# Patient Record
Sex: FEMALE | Race: OTHER | NOT HISPANIC OR LATINO
[De-identification: names, ages, dates, MRNs, and addresses within clinical notes are randomized per-mention and may not be internally consistent; named-entity substitution may affect disease eponyms.]

---

## 2021-02-18 ENCOUNTER — TRANSCRIPTION ENCOUNTER (OUTPATIENT)
Age: 26
End: 2021-02-18

## 2024-04-03 PROBLEM — Z00.00 ENCOUNTER FOR PREVENTIVE HEALTH EXAMINATION: Status: ACTIVE | Noted: 2024-04-03

## 2024-04-03 PROBLEM — R00.0 SINUS TACHYCARDIA: Status: ACTIVE | Noted: 2024-04-03

## 2024-04-04 ENCOUNTER — APPOINTMENT (OUTPATIENT)
Dept: NEUROSURGERY | Facility: CLINIC | Age: 29
End: 2024-04-04
Payer: COMMERCIAL

## 2024-04-04 VITALS
TEMPERATURE: 97.4 F | HEART RATE: 85 BPM | HEIGHT: 64 IN | DIASTOLIC BLOOD PRESSURE: 66 MMHG | BODY MASS INDEX: 18.78 KG/M2 | RESPIRATION RATE: 18 BRPM | WEIGHT: 110 LBS | SYSTOLIC BLOOD PRESSURE: 99 MMHG | OXYGEN SATURATION: 98 %

## 2024-04-04 DIAGNOSIS — S06.0XAA CONCUSSION WITH LOSS OF CONSCIOUSNESS STATUS UNKNOWN, INITIAL ENCOUNTER: ICD-10-CM

## 2024-04-04 DIAGNOSIS — Z86.19 PERSONAL HISTORY OF OTHER INFECTIOUS AND PARASITIC DISEASES: ICD-10-CM

## 2024-04-04 DIAGNOSIS — Z86.79 PERSONAL HISTORY OF OTHER DISEASES OF THE CIRCULATORY SYSTEM: ICD-10-CM

## 2024-04-04 DIAGNOSIS — G43.909 MIGRAINE, UNSPECIFIED, NOT INTRACTABLE, W/OUT STATUS MIGRAINOSUS: ICD-10-CM

## 2024-04-04 DIAGNOSIS — R00.0 TACHYCARDIA, UNSPECIFIED: ICD-10-CM

## 2024-04-04 PROCEDURE — 99205 OFFICE O/P NEW HI 60 MIN: CPT
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
## 2024-04-05 NOTE — PHYSICAL EXAM
[General Appearance - Alert] : alert [General Appearance - In No Acute Distress] : in no acute distress [Oriented To Time, Place, And Person] : oriented to person, place, and time [Impaired Insight] : insight and judgment were intact [Affect] : the affect was normal [Motor Tone] : muscle tone was normal in all four extremities [Motor Strength] : muscle strength was normal in all four extremities [Balance] : balance was intact [Sclera] : the sclera and conjunctiva were normal [Extraocular Movements] : extraocular movements were intact [Full Visual Field] : full visual field [Outer Ear] : the ears and nose were normal in appearance [Neck Appearance] : the appearance of the neck was normal [] : no respiratory distress [Abnormal Walk] : normal gait [Skin Color & Pigmentation] : normal skin color and pigmentation [Skin Lesions] : no skin lesions [FreeTextEntry1] : sinus tachycardia
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
## 2024-04-05 NOTE — REASON FOR VISIT
[New Patient Visit] : a new patient visit [Referred By: _________] : Patient was referred by ASHLEY [Parent] : parent [FreeTextEntry1] : Right Supraclinoid ICA and AComm aneurysm

## 2024-04-05 NOTE — ASSESSMENT
[FreeTextEntry1] : NELI GANDARA is a 28 year-old female who presents for neuroendovascular evaluation of incidental finding of 6mm saccular Supraclinoid ICA.    I have discussed the natural history and treatment options for cerebral aneurysms with the patient. I explained that a variety of factors must be taken into consideration when managing cerebral aneurysm, namely the lesion size, shape, location, and personal/family history, and patient's age. I informed the patient that the main risk with cerebral aneurysms is rupture, leading to Subarachnoid Hemorrhage. I detailed the consequences of SAH, including its morbidity and mortality. I then presented the options of 1) conservative management with serial imaging, 2) aneurysm repair via endovascular approaches vs microsurgery. I thoroughly discussed the indications, risks, possible complications, and expected outcomes of each treatment option with the patient. In the end, I recommend diagnostic cerebral aneurysm to better evaluate the aneurysm and cerebral vasculature.  I reviewed and answered all patient questions.  PLAN: - Diagnostic cerebral angiogram 4/19/24 - clearance given to patient - return to Dr. Solomon clinic to review - see Neurology for Concussion evaluation - recommend Connective Tissue workup in the future for aneurysm etiology   The patient was instructed that if they should immediately call 911 or go to the Emergency Department if they experience symptoms of severe thunderclap headache, syncope, unexplained nausea/vomiting, visual changes, seizure-like activity, new weakness or numbness of extremities.   Patient verbalizes agreement and understanding with plan of care.   I, Dr. Solomon, personally performed the evaluation and management (E/M) services for this new patient. That E/M includes conducting the initial examination, assessing all conditions, and establishing the plan of care. Today, NAHEED Vale was here to observe my evaluation and management services for this patient to be followed going forward.

## 2024-04-18 ENCOUNTER — APPOINTMENT (OUTPATIENT)
Dept: NEUROSURGERY | Facility: CLINIC | Age: 29
End: 2024-04-18
Payer: COMMERCIAL

## 2024-04-18 ENCOUNTER — TRANSCRIPTION ENCOUNTER (OUTPATIENT)
Age: 29
End: 2024-04-18

## 2024-04-18 VITALS
HEIGHT: 64 IN | DIASTOLIC BLOOD PRESSURE: 69 MMHG | BODY MASS INDEX: 18.78 KG/M2 | OXYGEN SATURATION: 99 % | TEMPERATURE: 97.6 F | HEART RATE: 77 BPM | RESPIRATION RATE: 18 BRPM | WEIGHT: 110 LBS | SYSTOLIC BLOOD PRESSURE: 102 MMHG

## 2024-04-18 PROCEDURE — 99215 OFFICE O/P EST HI 40 MIN: CPT

## 2024-04-25 ENCOUNTER — NON-APPOINTMENT (OUTPATIENT)
Age: 29
End: 2024-04-25

## 2024-04-25 NOTE — ASSESSMENT
[FreeTextEntry1] : NELI GANDARA is a 28 year-old female who presents for follow-up of saccular Right Supraclinoid ICA aneurysm and to review recent diagnostic cerebral angiogram done at [facility] 4/12/24, that shows 7x5mm Right Supraclinoid ICA aneurysm as well as another 4mm Right Hypophyseal aneurysm.   I have discussed the natural history and treatment options for cerebral aneurysms with the patient. I explained that a variety of factors must be taken into consideration when managing cerebral aneurysm, namely the lesion size, shape, location, and personal/family history, and patient's age. I informed the patient that the main risk with cerebral aneurysms is rupture, leading to Subarachnoid Hemorrhage. I detailed the consequences of SAH, including its morbidity and mortality. I then presented the options of 1) conservative management with serial imaging, 2) aneurysm repair via endovascular approaches vs microsurgery. I thoroughly discussed the indications, risks, possible complications, and expected outcomes of each treatment option with the patient. In the end, I recommend stent placement, 1 to 2 depending on what I encounter day of procedure.   I reviewed and answered all patient questions.  PLAN: - will discuss case in Neurovascular Conference; Dr. Solomon will call with results - f/u with Neurology for Concussion evaluation - recommend Connective Tissue workup in the future for aneurysm etiology  The patient was instructed that if they should immediately call 911 or go to the Emergency Department if they experience symptoms of severe thunderclap headache, syncope, unexplained nausea/vomiting, visual changes, seizure-like activity, new weakness or numbness of extremities.   Patient verbalizes agreement and understanding with plan of care.  I, Dr. Solomon, personally performed the evaluation and management (E/M) services for this established patient who presents today with (a) new problem(s)/exacerbation of (an) existing condition(s). That E/M includes conducting the examination, assessing all new/exacerbated conditions, and establishing a new plan of care. Today, NAHEED Vale, was here to observe my evaluation and management services for this new problem/exacerbated condition to be followed going forward.

## 2024-04-25 NOTE — PHYSICAL EXAM
[General Appearance - Alert] : alert [Oriented To Time, Place, And Person] : oriented to person, place, and time [Impaired Insight] : insight and judgment were intact [Affect] : the affect was normal [Mood] : the mood was normal [Motor Tone] : muscle tone was normal in all four extremities [Motor Strength] : muscle strength was normal in all four extremities [Balance] : balance was intact [Sclera] : the sclera and conjunctiva were normal [Extraocular Movements] : extraocular movements were intact [Full Visual Field] : full visual field [Outer Ear] : the ears and nose were normal in appearance [Neck Appearance] : the appearance of the neck was normal [] : no respiratory distress [Heart Rate And Rhythm] : heart rate was normal and rhythm regular [No CVA Tenderness] : no ~M costovertebral angle tenderness [Abnormal Walk] : normal gait [Skin Color & Pigmentation] : normal skin color and pigmentation [FreeTextEntry6] : Twice a week headaches that are 4 out of 10 pain, improves with Excedrin or Tylenol
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
## 2024-04-25 NOTE — REASON FOR VISIT
[Follow-Up: _____] : a [unfilled] follow-up visit [Parent] : parent [FreeTextEntry1] : Right ICA aneurysms x2
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
## 2024-05-03 ENCOUNTER — RX RENEWAL (OUTPATIENT)
Age: 29
End: 2024-05-03

## 2024-05-03 ENCOUNTER — APPOINTMENT (OUTPATIENT)
Dept: NEUROSURGERY | Facility: CLINIC | Age: 29
End: 2024-05-03

## 2024-05-03 DIAGNOSIS — S09.90XS POST-TRAUMATIC HEADACHE, UNSPECIFIED, NOT INTRACTABLE: ICD-10-CM

## 2024-05-03 DIAGNOSIS — G44.309 POST-TRAUMATIC HEADACHE, UNSPECIFIED, NOT INTRACTABLE: ICD-10-CM

## 2024-05-03 RX ORDER — ASPIRIN 81 MG/1
81 TABLET, COATED ORAL
Qty: 90 | Refills: 0 | Status: ACTIVE | COMMUNITY
Start: 2024-05-03 | End: 1900-01-01

## 2024-05-03 RX ORDER — CLOPIDOGREL BISULFATE 75 MG/1
75 TABLET, FILM COATED ORAL
Qty: 90 | Refills: 1 | Status: ACTIVE | COMMUNITY
Start: 2024-05-03 | End: 1900-01-01

## 2024-05-03 NOTE — REASON FOR VISIT
[Home] : at home, [unfilled] , at the time of the visit. [Medical Office: ([facility])___] : at the medical office located in  [Patient] : the patient [Self] : self [Follow-Up: _____] : a [unfilled] follow-up visit [FreeTextEntry1] : Preop appt

## 2024-05-03 NOTE — PHYSICAL EXAM
[General Appearance - Alert] : alert [General Appearance - In No Acute Distress] : in no acute distress [Oriented To Time, Place, And Person] : oriented to person, place, and time [Impaired Insight] : insight and judgment were intact [Affect] : the affect was normal [Mood] : the mood was normal [Extraocular Movements] : extraocular movements were intact [Sclera] : the sclera and conjunctiva were normal [Outer Ear] : the ears and nose were normal in appearance [Neck Appearance] : the appearance of the neck was normal [] : no respiratory distress [Skin Color & Pigmentation] : normal skin color and pigmentation

## 2024-05-03 NOTE — ASSESSMENT
[FreeTextEntry1] : NELI GANDARA is a 29 year-old female who presents today to my clinic to discuss preparation instructions for    PLAN: - stent placement procedure scheduled for 5/17/24 - medical clearance given to patient - start DAPT today 5/03 - check Accumetrics 5/13 - return to Dr. Solomon clinic to review    The patient was instructed that if they should immediately call 911 or go to the Emergency Department if they experience symptoms of severe thunderclap headache, syncope, unexplained nausea/vomiting, visual changes, seizure-like activity, new weakness or numbness of extremities.   Patient verbalizes agreement and understanding with plan of care.

## 2024-05-03 NOTE — REVIEW OF SYSTEMS
[Feeling Tired] : feeling tired [Memory Lapses or Loss] : memory loss [Decr. Concentrating Ability] : decreased concentrating ability [Sleep Disturbances] : sleep disturbances [Anxiety] : anxiety [As Noted in HPI] : as noted in HPI [Negative] : Respiratory [de-identified] : Only able to sleep 4-5 hrs at a time, previously 8hrs straight [FreeTextEntry3] : occasional floaters in Right eye
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
## 2024-05-13 ENCOUNTER — APPOINTMENT (OUTPATIENT)
Dept: OPHTHALMOLOGY | Facility: CLINIC | Age: 29
End: 2024-05-13
Payer: COMMERCIAL

## 2024-05-13 ENCOUNTER — NON-APPOINTMENT (OUTPATIENT)
Age: 29
End: 2024-05-13

## 2024-05-13 PROCEDURE — 92083 EXTENDED VISUAL FIELD XM: CPT

## 2024-05-13 PROCEDURE — 92004 COMPRE OPH EXAM NEW PT 1/>: CPT

## 2024-05-14 ENCOUNTER — NON-APPOINTMENT (OUTPATIENT)
Age: 29
End: 2024-05-14

## 2024-05-14 LAB
PA ADP PRP-ACNC: 74 PRU
PLATELET RESPONSE ASPIRIN: 376 ARU

## 2024-05-17 ENCOUNTER — APPOINTMENT (OUTPATIENT)
Dept: NEUROSURGERY | Facility: HOSPITAL | Age: 29
End: 2024-05-17

## 2024-05-17 ENCOUNTER — INPATIENT (INPATIENT)
Facility: HOSPITAL | Age: 29
LOS: 2 days | Discharge: HOME | DRG: 27 | End: 2024-05-20
Attending: RADIOLOGY | Admitting: RADIOLOGY
Payer: COMMERCIAL

## 2024-05-17 VITALS
DIASTOLIC BLOOD PRESSURE: 58 MMHG | SYSTOLIC BLOOD PRESSURE: 100 MMHG | RESPIRATION RATE: 16 BRPM | HEART RATE: 100 BPM | TEMPERATURE: 99 F | OXYGEN SATURATION: 100 %

## 2024-05-17 DIAGNOSIS — F41.9 ANXIETY DISORDER, UNSPECIFIED: ICD-10-CM

## 2024-05-17 DIAGNOSIS — I47.10 SUPRAVENTRICULAR TACHYCARDIA, UNSPECIFIED: ICD-10-CM

## 2024-05-17 DIAGNOSIS — J45.909 UNSPECIFIED ASTHMA, UNCOMPLICATED: ICD-10-CM

## 2024-05-17 DIAGNOSIS — I67.1 CEREBRAL ANEURYSM, NONRUPTURED: ICD-10-CM

## 2024-05-17 LAB
ALBUMIN SERPL ELPH-MCNC: 4.2 G/DL — SIGNIFICANT CHANGE UP (ref 3.3–5)
ALP SERPL-CCNC: 47 U/L — SIGNIFICANT CHANGE UP (ref 40–120)
ALT FLD-CCNC: 9 U/L — LOW (ref 10–45)
ANION GAP SERPL CALC-SCNC: 9 MMOL/L — SIGNIFICANT CHANGE UP (ref 5–17)
APTT BLD: 83.8 SEC — HIGH (ref 24.5–35.6)
APTT BLD: >200 SEC — CRITICAL HIGH (ref 24.5–35.6)
AST SERPL-CCNC: 14 U/L — SIGNIFICANT CHANGE UP (ref 10–40)
BILIRUB SERPL-MCNC: 0.5 MG/DL — SIGNIFICANT CHANGE UP (ref 0.2–1.2)
BUN SERPL-MCNC: 7 MG/DL — SIGNIFICANT CHANGE UP (ref 7–23)
CALCIUM SERPL-MCNC: 8.6 MG/DL — SIGNIFICANT CHANGE UP (ref 8.4–10.5)
CHLORIDE SERPL-SCNC: 110 MMOL/L — HIGH (ref 96–108)
CO2 SERPL-SCNC: 22 MMOL/L — SIGNIFICANT CHANGE UP (ref 22–31)
CREAT SERPL-MCNC: 0.58 MG/DL — SIGNIFICANT CHANGE UP (ref 0.5–1.3)
EGFR: 127 ML/MIN/1.73M2 — SIGNIFICANT CHANGE UP
GLUCOSE SERPL-MCNC: 119 MG/DL — HIGH (ref 70–99)
HCG UR QL: NEGATIVE — SIGNIFICANT CHANGE UP
HCT VFR BLD CALC: 34.5 % — SIGNIFICANT CHANGE UP (ref 34.5–45)
HGB BLD-MCNC: 11.2 G/DL — LOW (ref 11.5–15.5)
INR BLD: 1.04 — SIGNIFICANT CHANGE UP (ref 0.85–1.18)
ISTAT ACTK (ACTIVATED CLOTTING TIME KAOLIN): 141 SEC — HIGH (ref 74–137)
ISTAT ACTK (ACTIVATED CLOTTING TIME KAOLIN): 228 SEC — HIGH (ref 74–137)
MAGNESIUM SERPL-MCNC: 1.9 MG/DL — SIGNIFICANT CHANGE UP (ref 1.6–2.6)
MCHC RBC-ENTMCNC: 30.2 PG — SIGNIFICANT CHANGE UP (ref 27–34)
MCHC RBC-ENTMCNC: 32.5 GM/DL — SIGNIFICANT CHANGE UP (ref 32–36)
MCV RBC AUTO: 93 FL — SIGNIFICANT CHANGE UP (ref 80–100)
NRBC # BLD: 0 /100 WBCS — SIGNIFICANT CHANGE UP (ref 0–0)
PA ADP PRP-ACNC: 123 PRU — LOW (ref 194–418)
PA ADP PRP-ACNC: 197 PRU — SIGNIFICANT CHANGE UP (ref 194–418)
PHOSPHATE SERPL-MCNC: 3 MG/DL — SIGNIFICANT CHANGE UP (ref 2.5–4.5)
PLATELET # BLD AUTO: 224 K/UL — SIGNIFICANT CHANGE UP (ref 150–400)
POTASSIUM SERPL-MCNC: 3.7 MMOL/L — SIGNIFICANT CHANGE UP (ref 3.5–5.3)
POTASSIUM SERPL-SCNC: 3.7 MMOL/L — SIGNIFICANT CHANGE UP (ref 3.5–5.3)
PROT SERPL-MCNC: 6.5 G/DL — SIGNIFICANT CHANGE UP (ref 6–8.3)
PROTHROM AB SERPL-ACNC: 11.8 SEC — SIGNIFICANT CHANGE UP (ref 9.5–13)
RBC # BLD: 3.71 M/UL — LOW (ref 3.8–5.2)
RBC # FLD: 12.4 % — SIGNIFICANT CHANGE UP (ref 10.3–14.5)
SODIUM SERPL-SCNC: 141 MMOL/L — SIGNIFICANT CHANGE UP (ref 135–145)
WBC # BLD: 5.21 K/UL — SIGNIFICANT CHANGE UP (ref 3.8–10.5)
WBC # FLD AUTO: 5.21 K/UL — SIGNIFICANT CHANGE UP (ref 3.8–10.5)

## 2024-05-17 PROCEDURE — 75894 X-RAYS TRANSCATH THERAPY: CPT | Mod: 26,59

## 2024-05-17 PROCEDURE — 61624 TCAT PERM OCCLS/EMBOLJ CNS: CPT

## 2024-05-17 PROCEDURE — 70450 CT HEAD/BRAIN W/O DYE: CPT | Mod: 26

## 2024-05-17 PROCEDURE — 61650 EVASC PRLNG ADMN RX AGNT 1ST: CPT

## 2024-05-17 PROCEDURE — 99291 CRITICAL CARE FIRST HOUR: CPT

## 2024-05-17 PROCEDURE — 75898 FOLLOW-UP ANGIOGRAPHY: CPT | Mod: 26,59

## 2024-05-17 PROCEDURE — 76377 3D RENDER W/INTRP POSTPROCES: CPT | Mod: 26

## 2024-05-17 PROCEDURE — 76937 US GUIDE VASCULAR ACCESS: CPT | Mod: 26

## 2024-05-17 RX ORDER — PROPRANOLOL HCL 160 MG
1 CAPSULE, EXTENDED RELEASE 24HR ORAL
Refills: 0 | DISCHARGE

## 2024-05-17 RX ORDER — SODIUM CHLORIDE 9 MG/ML
1000 INJECTION INTRAMUSCULAR; INTRAVENOUS; SUBCUTANEOUS
Refills: 0 | Status: DISCONTINUED | OUTPATIENT
Start: 2024-05-17 | End: 2024-05-17

## 2024-05-17 RX ORDER — METHOCARBAMOL 500 MG/1
500 TABLET, FILM COATED ORAL ONCE
Refills: 0 | Status: COMPLETED | OUTPATIENT
Start: 2024-05-17 | End: 2024-05-17

## 2024-05-17 RX ORDER — ACETAMINOPHEN 500 MG
1000 TABLET ORAL ONCE
Refills: 0 | Status: COMPLETED | OUTPATIENT
Start: 2024-05-17 | End: 2024-05-17

## 2024-05-17 RX ORDER — ASPIRIN/CALCIUM CARB/MAGNESIUM 324 MG
81 TABLET ORAL
Refills: 0 | Status: DISCONTINUED | OUTPATIENT
Start: 2024-05-18 | End: 2024-05-20

## 2024-05-17 RX ORDER — POLYETHYLENE GLYCOL 3350 17 G/17G
17 POWDER, FOR SOLUTION ORAL DAILY
Refills: 0 | Status: DISCONTINUED | OUTPATIENT
Start: 2024-05-17 | End: 2024-05-19

## 2024-05-17 RX ORDER — OXYCODONE HYDROCHLORIDE 5 MG/1
5 TABLET ORAL ONCE
Refills: 0 | Status: DISCONTINUED | OUTPATIENT
Start: 2024-05-17 | End: 2024-05-17

## 2024-05-17 RX ORDER — OXYCODONE HYDROCHLORIDE 5 MG/1
10 TABLET ORAL EVERY 4 HOURS
Refills: 0 | Status: DISCONTINUED | OUTPATIENT
Start: 2024-05-17 | End: 2024-05-18

## 2024-05-17 RX ORDER — CHLORHEXIDINE GLUCONATE 213 G/1000ML
1 SOLUTION TOPICAL ONCE
Refills: 0 | Status: DISCONTINUED | OUTPATIENT
Start: 2024-05-17 | End: 2024-05-17

## 2024-05-17 RX ORDER — CHLORHEXIDINE GLUCONATE 213 G/1000ML
1 SOLUTION TOPICAL
Refills: 0 | Status: DISCONTINUED | OUTPATIENT
Start: 2024-05-17 | End: 2024-05-19

## 2024-05-17 RX ORDER — OXYCODONE HYDROCHLORIDE 5 MG/1
5 TABLET ORAL EVERY 6 HOURS
Refills: 0 | Status: DISCONTINUED | OUTPATIENT
Start: 2024-05-17 | End: 2024-05-17

## 2024-05-17 RX ORDER — TRAMADOL HYDROCHLORIDE 50 MG/1
50 TABLET ORAL EVERY 6 HOURS
Refills: 0 | Status: DISCONTINUED | OUTPATIENT
Start: 2024-05-17 | End: 2024-05-17

## 2024-05-17 RX ORDER — POTASSIUM CHLORIDE 20 MEQ
40 PACKET (EA) ORAL ONCE
Refills: 0 | Status: COMPLETED | OUTPATIENT
Start: 2024-05-17 | End: 2024-05-17

## 2024-05-17 RX ORDER — SODIUM CHLORIDE 9 MG/ML
1000 INJECTION INTRAMUSCULAR; INTRAVENOUS; SUBCUTANEOUS ONCE
Refills: 0 | Status: COMPLETED | OUTPATIENT
Start: 2024-05-17 | End: 2024-05-17

## 2024-05-17 RX ORDER — CLOPIDOGREL BISULFATE 75 MG/1
75 TABLET, FILM COATED ORAL
Refills: 0 | Status: DISCONTINUED | OUTPATIENT
Start: 2024-05-18 | End: 2024-05-20

## 2024-05-17 RX ORDER — MAGNESIUM SULFATE 500 MG/ML
1 VIAL (ML) INJECTION ONCE
Refills: 0 | Status: COMPLETED | OUTPATIENT
Start: 2024-05-17 | End: 2024-05-17

## 2024-05-17 RX ORDER — OXYCODONE HYDROCHLORIDE 5 MG/1
5 TABLET ORAL EVERY 4 HOURS
Refills: 0 | Status: DISCONTINUED | OUTPATIENT
Start: 2024-05-17 | End: 2024-05-18

## 2024-05-17 RX ORDER — HEPARIN SODIUM 5000 [USP'U]/ML
500 INJECTION INTRAVENOUS; SUBCUTANEOUS
Qty: 25000 | Refills: 0 | Status: DISCONTINUED | OUTPATIENT
Start: 2024-05-17 | End: 2024-05-18

## 2024-05-17 RX ORDER — CLOPIDOGREL BISULFATE 75 MG/1
75 TABLET, FILM COATED ORAL DAILY
Refills: 0 | Status: DISCONTINUED | OUTPATIENT
Start: 2024-05-17 | End: 2024-05-17

## 2024-05-17 RX ORDER — ASPIRIN/CALCIUM CARB/MAGNESIUM 324 MG
81 TABLET ORAL DAILY
Refills: 0 | Status: DISCONTINUED | OUTPATIENT
Start: 2024-05-17 | End: 2024-05-17

## 2024-05-17 RX ADMIN — OXYCODONE HYDROCHLORIDE 5 MILLIGRAM(S): 5 TABLET ORAL at 23:50

## 2024-05-17 RX ADMIN — Medication 100 GRAM(S): at 15:16

## 2024-05-17 RX ADMIN — Medication 400 MILLIGRAM(S): at 17:35

## 2024-05-17 RX ADMIN — Medication 1000 MILLIGRAM(S): at 19:11

## 2024-05-17 RX ADMIN — Medication 40 MILLIEQUIVALENT(S): at 15:16

## 2024-05-17 RX ADMIN — OXYCODONE HYDROCHLORIDE 5 MILLIGRAM(S): 5 TABLET ORAL at 16:41

## 2024-05-17 RX ADMIN — METHOCARBAMOL 500 MILLIGRAM(S): 500 TABLET, FILM COATED ORAL at 14:44

## 2024-05-17 RX ADMIN — TRAMADOL HYDROCHLORIDE 50 MILLIGRAM(S): 50 TABLET ORAL at 13:09

## 2024-05-17 RX ADMIN — TRAMADOL HYDROCHLORIDE 50 MILLIGRAM(S): 50 TABLET ORAL at 14:10

## 2024-05-17 RX ADMIN — Medication 400 MILLIGRAM(S): at 22:53

## 2024-05-17 RX ADMIN — SODIUM CHLORIDE 50 MILLILITER(S): 9 INJECTION INTRAMUSCULAR; INTRAVENOUS; SUBCUTANEOUS at 13:21

## 2024-05-17 RX ADMIN — Medication 1000 MILLIGRAM(S): at 23:07

## 2024-05-17 RX ADMIN — SODIUM CHLORIDE 1000 MILLILITER(S): 9 INJECTION INTRAMUSCULAR; INTRAVENOUS; SUBCUTANEOUS at 20:19

## 2024-05-17 RX ADMIN — HEPARIN SODIUM 5 UNIT(S)/HR: 5000 INJECTION INTRAVENOUS; SUBCUTANEOUS at 15:51

## 2024-05-17 RX ADMIN — OXYCODONE HYDROCHLORIDE 5 MILLIGRAM(S): 5 TABLET ORAL at 17:53

## 2024-05-17 NOTE — H&P ADULT - NSHPPHYSICALEXAM_GEN_ALL_CORE
Constitutional:   y/o male/ female awake, alert in no acute distress.  Eyes:  Sclera anicteric, conjunctiva noninjected.  ENMT: Oropharyngeal mucosa moist, pink. Tongue midline.    Neck: Neck supple, FROM.  No appreciable lymphadenopathy.  Back:  No pain to palpation/percussion of low back. No CVA tenderness.  Respiratory: Clear to auscultation bilaterally.  No rales, rhonchi, wheezes.  Cardiovascular: Regular rate and rhythm.  S1, S2 heard.  Gastrointestinal:  Soft, nontender, nondistended.  +BS.  Genitourinary:  Deferred.  Rectal: Deferred.  Vascular: Extremities warm, no ulcers, no discoloration of skin.   Neurological: Gen: AA&O x 3, conversant, appropriate.      CN II-XII grossly intact.    Motor: ANDREA x 4, 5/5 throughout UE/LE.    Sens: Sensation intact to light touch throughout.    DTRs: 2+ symmetric throughout.    Hoffmans negative bilaterally.  Plantar downgoing bilaterally.  No clonus.      No pronator drift, no dysmetria.  Skin: Warm, dry, no erythema. Gen: AAOx3, WN/WD. NAD.  HEENT: PERRL, EOMI, VF grossly intact.  Neck: Neck supple, FROM.  No appreciable lymphadenopathy.  Respiratory: Clear to auscultation bilaterally.  No rales, rhonchi, wheezes.  Cardiovascular: Regular rate and rhythm.  S1, S2 heard.  Gastrointestinal:  Soft, nontender, nondistended.  +BS.  Genitourinary:  Deferred.  Rectal: Deferred.  Vascular: Extremities warm, no ulcers, no discoloration of skin. Pulses 2+ throughout.  Neurological: CNs II-XII intact. 5/5 str x4 extremities. Sensation to LT intact throughout. Speech clear. Following commands. Gait WNL.  Skin: Warm, dry, no erythema.
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
## 2024-05-17 NOTE — PROGRESS NOTE ADULT - ASSESSMENT
29y/F with  R supraclinoid, R superior hypophyseal aneurysm, s/p angio / pipeline FDS (05/17/2024, Dr. Solomon)  SVT  asthma  anxiety    PLAN:   NEURO: neurochecks q1h, PRN pain meds with acetaminophen, oxycodone  continue dual antiplatelet therapy  heparin gtt til am as per protocol  groin checks  recheck P2Y12  REHAB:  physical therapy evaluation and management    EARLY MOB:  HOB up    PULM:  PRN O2 support to keep sats >/=92%, incentive spirometry  CARDIO:  SBP goal 100-140mm Hg; PRN propranolol for SVT  ENDO:  Blood sugar goals 140-180 mg/dL, continue insulin sliding scale  GI:  bowel regimen  DIET: regular diet  RENAL:  IVL once eating well  HEM/ONC: check Hb  VTE Prophylaxis: SCDs, heparin gtt  ID: afebrile, no leukocytosis  Social: will update family    Active issues:  What's keeping patient in the ICU? close neurochecks post-procedure  What is this patient's dispo plan?    ATTENDING ATTESTATION:  I was physically present for the key portions of the evaluation and management (E/M) service provided.  I agree with the above history, physical and plan, which I have reviewed and edited where appropriate.    Patient at high risk for neurological deterioration or death due to:  ICU delirium, aspiration PNA, DVT / PE.  Critical care time:  I have personally provided 60 minutes of critical care time, excluding time spent on separate procedures.      Plan discussed with RN, house staff.

## 2024-05-17 NOTE — BRIEF OPERATIVE NOTE - OPERATION/FINDINGS
Elective cerebral angiogram performed under GA initially via right radial access 7Fr, which was not amenable to perform the intervention, and then via right CFA using a 6Fr Neuronmax catheter. 10mg of Verapamil given into the right ICA after initial right radial access attempt with radiographic improvement in vessel caliber. 3D imaging performed with findings of a right supraclinoid ICA and right superior hypophyseal artery aneurysm as previously identified. A Pipeline flow diverter stent was placed across both aneurysm in the ICA and confirmed in good position/purchase with Xper CTA spin. Patient was heparinized throughout the procedure with  at the end of the procedure. P2Y12 sent during the case was 194, which will be rechecked and addressed post procedure. Right groin was closed with angioseal and safeguard applied, hemostasis obtained. Right radial wrist with TR band with no bleeding noted with 14cc of air. Straight cath performed at the end of the procedure for 800cc of urine. Patient remained hemodynamically stable throughout the procedure.    Full report to follow, d/w Dr. Solomon.
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
## 2024-05-17 NOTE — PROGRESS NOTE ADULT - ASSESSMENT
29 f hx pSVT, asthma admit for R supraclinoid ICA & R superior hypophyseal artery aneurysem pipeline flow diverter stent placement POD 0     -c/w NC q1, VS q1   -c/w asa 81mg & plavix 75mg   -fixed rate heparin ggt to be d/c'd 5am   -regular diet   -bed rest   -remove TR & Safeguard bands, monitor for bleeding

## 2024-05-17 NOTE — BRIEF OPERATIVE NOTE - NSICDXBRIEFPROCEDURE_GEN_ALL_CORE_FT
PROCEDURES:  Angiogram, cerebral, with embolization 17-May-2024 12:36:01 Right supraclinoid and right superior hypophyseal Navdeep Juares

## 2024-05-17 NOTE — H&P ADULT - PROBLEM SELECTOR PLAN 1
Continue ASA and Plavix,  Consent in chart, all R/B/A discussed,  Medical clearance reviewed,  Pregnancy negative,  D/w Dr. Solomon
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
## 2024-05-18 ENCOUNTER — TRANSCRIPTION ENCOUNTER (OUTPATIENT)
Age: 29
End: 2024-05-18

## 2024-05-18 LAB
ANION GAP SERPL CALC-SCNC: 6 MMOL/L — SIGNIFICANT CHANGE UP (ref 5–17)
BUN SERPL-MCNC: 4 MG/DL — LOW (ref 7–23)
CALCIUM SERPL-MCNC: 8.7 MG/DL — SIGNIFICANT CHANGE UP (ref 8.4–10.5)
CHLORIDE SERPL-SCNC: 108 MMOL/L — SIGNIFICANT CHANGE UP (ref 96–108)
CO2 SERPL-SCNC: 23 MMOL/L — SIGNIFICANT CHANGE UP (ref 22–31)
CREAT SERPL-MCNC: 0.45 MG/DL — LOW (ref 0.5–1.3)
EGFR: 135 ML/MIN/1.73M2 — SIGNIFICANT CHANGE UP
GLUCOSE SERPL-MCNC: 99 MG/DL — SIGNIFICANT CHANGE UP (ref 70–99)
HCT VFR BLD CALC: 31.6 % — LOW (ref 34.5–45)
HGB BLD-MCNC: 10.3 G/DL — LOW (ref 11.5–15.5)
MAGNESIUM SERPL-MCNC: 2.3 MG/DL — SIGNIFICANT CHANGE UP (ref 1.6–2.6)
MCHC RBC-ENTMCNC: 30 PG — SIGNIFICANT CHANGE UP (ref 27–34)
MCHC RBC-ENTMCNC: 32.6 GM/DL — SIGNIFICANT CHANGE UP (ref 32–36)
MCV RBC AUTO: 92.1 FL — SIGNIFICANT CHANGE UP (ref 80–100)
NRBC # BLD: 0 /100 WBCS — SIGNIFICANT CHANGE UP (ref 0–0)
PHOSPHATE SERPL-MCNC: 2.7 MG/DL — SIGNIFICANT CHANGE UP (ref 2.5–4.5)
PLATELET # BLD AUTO: 218 K/UL — SIGNIFICANT CHANGE UP (ref 150–400)
POTASSIUM SERPL-MCNC: 3.7 MMOL/L — SIGNIFICANT CHANGE UP (ref 3.5–5.3)
POTASSIUM SERPL-SCNC: 3.7 MMOL/L — SIGNIFICANT CHANGE UP (ref 3.5–5.3)
RBC # BLD: 3.43 M/UL — LOW (ref 3.8–5.2)
RBC # FLD: 12.8 % — SIGNIFICANT CHANGE UP (ref 10.3–14.5)
SODIUM SERPL-SCNC: 137 MMOL/L — SIGNIFICANT CHANGE UP (ref 135–145)
WBC # BLD: 8.58 K/UL — SIGNIFICANT CHANGE UP (ref 3.8–10.5)
WBC # FLD AUTO: 8.58 K/UL — SIGNIFICANT CHANGE UP (ref 3.8–10.5)

## 2024-05-18 PROCEDURE — 70496 CT ANGIOGRAPHY HEAD: CPT | Mod: 26

## 2024-05-18 PROCEDURE — 99233 SBSQ HOSP IP/OBS HIGH 50: CPT

## 2024-05-18 PROCEDURE — 70450 CT HEAD/BRAIN W/O DYE: CPT | Mod: 26

## 2024-05-18 PROCEDURE — 99232 SBSQ HOSP IP/OBS MODERATE 35: CPT

## 2024-05-18 PROCEDURE — 70498 CT ANGIOGRAPHY NECK: CPT | Mod: 26

## 2024-05-18 RX ORDER — KETOROLAC TROMETHAMINE 30 MG/ML
15 SYRINGE (ML) INJECTION EVERY 8 HOURS
Refills: 0 | Status: DISCONTINUED | OUTPATIENT
Start: 2024-05-18 | End: 2024-05-20

## 2024-05-18 RX ORDER — DEXAMETHASONE 0.5 MG/5ML
2 ELIXIR ORAL EVERY 6 HOURS
Refills: 0 | Status: DISCONTINUED | OUTPATIENT
Start: 2024-05-20 | End: 2024-05-20

## 2024-05-18 RX ORDER — DEXAMETHASONE 0.5 MG/5ML
4 ELIXIR ORAL EVERY 6 HOURS
Refills: 0 | Status: COMPLETED | OUTPATIENT
Start: 2024-05-18 | End: 2024-05-19

## 2024-05-18 RX ORDER — TRAMADOL HYDROCHLORIDE 50 MG/1
50 TABLET ORAL EVERY 4 HOURS
Refills: 0 | Status: DISCONTINUED | OUTPATIENT
Start: 2024-05-18 | End: 2024-05-20

## 2024-05-18 RX ORDER — DEXAMETHASONE 0.5 MG/5ML
2 ELIXIR ORAL DAILY
Refills: 0 | Status: CANCELLED | OUTPATIENT
Start: 2024-05-24 | End: 2024-05-20

## 2024-05-18 RX ORDER — DEXAMETHASONE 0.5 MG/5ML
2 ELIXIR ORAL EVERY 12 HOURS
Refills: 0 | Status: CANCELLED | OUTPATIENT
Start: 2024-05-23 | End: 2024-05-20

## 2024-05-18 RX ORDER — METOCLOPRAMIDE HCL 10 MG
5 TABLET ORAL ONCE
Refills: 0 | Status: COMPLETED | OUTPATIENT
Start: 2024-05-18 | End: 2024-05-18

## 2024-05-18 RX ORDER — SENNA PLUS 8.6 MG/1
1 TABLET ORAL AT BEDTIME
Refills: 0 | Status: DISCONTINUED | OUTPATIENT
Start: 2024-05-18 | End: 2024-05-20

## 2024-05-18 RX ORDER — IBUPROFEN 200 MG
400 TABLET ORAL ONCE
Refills: 0 | Status: COMPLETED | OUTPATIENT
Start: 2024-05-18 | End: 2024-05-18

## 2024-05-18 RX ORDER — DEXAMETHASONE 0.5 MG/5ML
4 ELIXIR ORAL EVERY 8 HOURS
Refills: 0 | Status: COMPLETED | OUTPATIENT
Start: 2024-05-19 | End: 2024-05-20

## 2024-05-18 RX ORDER — DEXAMETHASONE 0.5 MG/5ML
4 ELIXIR ORAL ONCE
Refills: 0 | Status: COMPLETED | OUTPATIENT
Start: 2024-05-18 | End: 2024-05-18

## 2024-05-18 RX ORDER — OXYCODONE HYDROCHLORIDE 5 MG/1
1 TABLET ORAL
Qty: 16 | Refills: 0
Start: 2024-05-18 | End: 2024-05-21

## 2024-05-18 RX ORDER — PANTOPRAZOLE SODIUM 20 MG/1
40 TABLET, DELAYED RELEASE ORAL
Refills: 0 | Status: DISCONTINUED | OUTPATIENT
Start: 2024-05-18 | End: 2024-05-20

## 2024-05-18 RX ORDER — ENOXAPARIN SODIUM 100 MG/ML
40 INJECTION SUBCUTANEOUS
Refills: 0 | Status: DISCONTINUED | OUTPATIENT
Start: 2024-05-18 | End: 2024-05-20

## 2024-05-18 RX ORDER — POTASSIUM CHLORIDE 20 MEQ
40 PACKET (EA) ORAL ONCE
Refills: 0 | Status: COMPLETED | OUTPATIENT
Start: 2024-05-18 | End: 2024-05-18

## 2024-05-18 RX ORDER — TRAMADOL HYDROCHLORIDE 50 MG/1
100 TABLET ORAL EVERY 4 HOURS
Refills: 0 | Status: DISCONTINUED | OUTPATIENT
Start: 2024-05-18 | End: 2024-05-20

## 2024-05-18 RX ORDER — SODIUM,POTASSIUM PHOSPHATES 278-250MG
1 POWDER IN PACKET (EA) ORAL ONCE
Refills: 0 | Status: COMPLETED | OUTPATIENT
Start: 2024-05-18 | End: 2024-05-18

## 2024-05-18 RX ORDER — SODIUM CHLORIDE 9 MG/ML
1000 INJECTION INTRAMUSCULAR; INTRAVENOUS; SUBCUTANEOUS ONCE
Refills: 0 | Status: COMPLETED | OUTPATIENT
Start: 2024-05-18 | End: 2024-05-18

## 2024-05-18 RX ORDER — DEXAMETHASONE 0.5 MG/5ML
ELIXIR ORAL
Refills: 0 | Status: DISCONTINUED | OUTPATIENT
Start: 2024-05-18 | End: 2024-05-20

## 2024-05-18 RX ORDER — DEXAMETHASONE 0.5 MG/5ML
2 ELIXIR ORAL EVERY 8 HOURS
Refills: 0 | Status: CANCELLED | OUTPATIENT
Start: 2024-05-22 | End: 2024-05-20

## 2024-05-18 RX ORDER — EPINEPHRINE 0.3 MG/.3ML
0.3 INJECTION INTRAMUSCULAR; SUBCUTANEOUS
Refills: 0 | DISCHARGE

## 2024-05-18 RX ORDER — POLYETHYLENE GLYCOL 3350 17 G/17G
17 POWDER, FOR SOLUTION ORAL
Qty: 0 | Refills: 0 | DISCHARGE
Start: 2024-05-18

## 2024-05-18 RX ORDER — ACETAMINOPHEN 500 MG
1000 TABLET ORAL ONCE
Refills: 0 | Status: COMPLETED | OUTPATIENT
Start: 2024-05-18 | End: 2024-05-18

## 2024-05-18 RX ADMIN — OXYCODONE HYDROCHLORIDE 5 MILLIGRAM(S): 5 TABLET ORAL at 00:20

## 2024-05-18 RX ADMIN — Medication 4 MILLIGRAM(S): at 23:35

## 2024-05-18 RX ADMIN — ENOXAPARIN SODIUM 40 MILLIGRAM(S): 100 INJECTION SUBCUTANEOUS at 22:13

## 2024-05-18 RX ADMIN — OXYCODONE HYDROCHLORIDE 10 MILLIGRAM(S): 5 TABLET ORAL at 12:53

## 2024-05-18 RX ADMIN — SODIUM CHLORIDE 1000 MILLILITER(S): 9 INJECTION INTRAMUSCULAR; INTRAVENOUS; SUBCUTANEOUS at 10:49

## 2024-05-18 RX ADMIN — Medication 400 MILLIGRAM(S): at 09:19

## 2024-05-18 RX ADMIN — OXYCODONE HYDROCHLORIDE 10 MILLIGRAM(S): 5 TABLET ORAL at 12:23

## 2024-05-18 RX ADMIN — Medication 40 MILLIEQUIVALENT(S): at 07:21

## 2024-05-18 RX ADMIN — Medication 2 CAPSULE(S): at 11:58

## 2024-05-18 RX ADMIN — Medication 15 MILLIGRAM(S): at 16:18

## 2024-05-18 RX ADMIN — SENNA PLUS 1 TABLET(S): 8.6 TABLET ORAL at 22:14

## 2024-05-18 RX ADMIN — Medication 1 PACKET(S): at 07:21

## 2024-05-18 RX ADMIN — CHLORHEXIDINE GLUCONATE 1 APPLICATION(S): 213 SOLUTION TOPICAL at 06:46

## 2024-05-18 RX ADMIN — Medication 15 MILLIGRAM(S): at 16:15

## 2024-05-18 RX ADMIN — Medication 400 MILLIGRAM(S): at 05:59

## 2024-05-18 RX ADMIN — TRAMADOL HYDROCHLORIDE 50 MILLIGRAM(S): 50 TABLET ORAL at 21:40

## 2024-05-18 RX ADMIN — PANTOPRAZOLE SODIUM 40 MILLIGRAM(S): 20 TABLET, DELAYED RELEASE ORAL at 18:01

## 2024-05-18 RX ADMIN — Medication 1000 MILLIGRAM(S): at 06:15

## 2024-05-18 RX ADMIN — TRAMADOL HYDROCHLORIDE 50 MILLIGRAM(S): 50 TABLET ORAL at 20:35

## 2024-05-18 RX ADMIN — Medication 81 MILLIGRAM(S): at 06:08

## 2024-05-18 RX ADMIN — Medication 4 MILLIGRAM(S): at 17:57

## 2024-05-18 RX ADMIN — CLOPIDOGREL BISULFATE 75 MILLIGRAM(S): 75 TABLET, FILM COATED ORAL at 06:08

## 2024-05-18 RX ADMIN — Medication 5 MILLIGRAM(S): at 14:37

## 2024-05-18 RX ADMIN — Medication 2 CAPSULE(S): at 11:28

## 2024-05-18 RX ADMIN — TRAMADOL HYDROCHLORIDE 50 MILLIGRAM(S): 50 TABLET ORAL at 14:53

## 2024-05-18 RX ADMIN — Medication 4 MILLIGRAM(S): at 14:53
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
## 2024-05-18 NOTE — DISCHARGE NOTE PROVIDER - NSDCCPTREATMENT_GEN_ALL_CORE_FT
PRINCIPAL PROCEDURE  Procedure: Angiogram, cerebral, with embolization  Findings and Treatment: Right supraclinoid and right superior hypophyseal     PRINCIPAL PROCEDURE  Procedure: Angiogram, cerebral, with embolization  Findings and Treatment: S/p cerebral angiogram for flow diverter stent for right  supraclinoid ICA and hypophyseal cerebral aneurysms (5/17).

## 2024-05-18 NOTE — DISCHARGE NOTE PROVIDER - NSDCCPCAREPLAN_GEN_ALL_CORE_FT
PRINCIPAL DISCHARGE DIAGNOSIS  Diagnosis: Cerebral aneurysm  Assessment and Plan of Treatment:       SECONDARY DISCHARGE DIAGNOSES  Diagnosis: Paroxysmal SVT (supraventricular tachycardia)  Assessment and Plan of Treatment:     Diagnosis: Anxiety  Assessment and Plan of Treatment:     Diagnosis: Asthma  Assessment and Plan of Treatment:      PRINCIPAL DISCHARGE DIAGNOSIS  Diagnosis: Cerebral aneurysm  Assessment and Plan of Treatment: S/p cerebral angiogram for flow diverter stent for right  supraclinoid ICA and hypophyseal cerebral aneurysms (5/17).      SECONDARY DISCHARGE DIAGNOSES  Diagnosis: Asthma  Assessment and Plan of Treatment:     Diagnosis: Paroxysmal SVT (supraventricular tachycardia)  Assessment and Plan of Treatment:     Diagnosis: Anxiety  Assessment and Plan of Treatment:
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
## 2024-05-18 NOTE — DISCHARGE NOTE PROVIDER - NSDCFUADDINST_GEN_ALL_CORE_FT
Please call the Neurosurgery office to confirm appointment with Dr. Solomon: [phone]    Groin Wound Care:  - steri strips underneath will fall off on their own   - you may shower today  - if you received a "closure device" in your groin, no bathing or swimming for 5 days    Activity  - fatigue is common after surgery, rest if you feel tired   - no bending, lifting, twisting   - walking is recommended, ambulate as tolerated  - you may shower at home/rehab, keep your groin and cranial site incision dry   - no soaking in a tub/pool/hot tub  - no driving within 24 hours of anesthesia or while taking prescription pain medications   - keep hydrated, drink plenty of water   - if your wrist was used to access, do not lift more than 5lbs for 3 days  - if groin access, do not lift more than 10lbs for 5 days    Diet:  - You may resume your regular diet.  - Drinking extra fluids (water, juice) is encouraged.  - Abstain from alcohol for 24 hours.    Please also follow up with your primary care doctor.     Pain Expectations:  - A mild pain at the puncture site is not unusual.  - You may take Tylenol 1-2 tabs every 4-6 hours as needed   - If the pain persists after 24 hours contact the office at [phone]    Medications:  - Please continue taking Aspirin 81mg once daily and Plavix 75mg once daily  - Please continue taking your home medications as prescribed, including your propranolol  - pain medications can cause constipation, you should eat a high fiber diet and may take a stool softener while on pain meds     Call the office or come to the ED if:  - wound has drainage or bleeding, increased redness or pain at incisoin site, neurological change, fever (>101), chills, night sweats, syncope, nausea/vomiting, chest pain, shortness of breath    SPECIAL INSTRUCTIONS S/P ANGIOGRAPHY:  Signs and symptoms to look out for:    1. Ty bleeding from the puncture site is an emergency. Put direct pressure on the site and go directly to your local Emergency Room for treatment.    2. Bleeding under the skin may also occur and a small "black and blue" may be expected. If there appears to be an expanding mass or swelling around the puncture site, apply manual compression and go immediately to your local Emergency Room for treatment.    3. If your foot/leg or hand/arm (puncture site) becomes cool or blue and/or you are unable to move it, this must be treated as an emergency. Go directly to your local Emergency Room for treatment.    4. Excessive puncture site pain is abnormal and should be assessed.    5.  Look out for signs of infection in the puncture site: fever, red streaking of the leg or wrist, drainage, severe pain.    6.  Lack of adequate urine output, provided you are drinking enough fluids, may be cause for concern, notify us if you think this is the case.    Playback:  - Discharge instructions are posted on Playback for your reference    WITHIN 24 HOURS OF DISCHARGE, PLEASE CONTACT NEURO PA  WITH ANY QUESTIONS OR CONCERNS: [phone]   OTHERWISE, PLEASE CALL THE OFFICE WITH ANY QUESTIONS OR CONCERNS:  Please call the Neurosurgery office to confirm appointment with Dr. Solomon: [phone]    Groin Wound Care:  - You may remove gauze and tegaderm from your wrist and right groin tomorrow  - steri strips underneath will fall off on their own   - you may shower today  - if you received a "closure device" in your groin, no bathing or swimming for 5 days    Activity  - fatigue is common after surgery, rest if you feel tired   - no bending, lifting, twisting   - walking is recommended, ambulate as tolerated  - you may shower at home/rehab, keep your groin and cranial site incision dry   - no soaking in a tub/pool/hot tub  - no driving within 24 hours of anesthesia or while taking prescription pain medications   - keep hydrated, drink plenty of water   - if your wrist was used to access, do not lift more than 5lbs for 3 days  - if groin access, do not lift more than 10lbs for 5 days    Diet:  - You may resume your regular diet.  - Drinking extra fluids (water, juice) is encouraged.  - Abstain from alcohol for 24 hours.    Please also follow up with your primary care doctor.     Pain Expectations:  - A mild pain at the puncture site is not unusual.  - You may take Tylenol 1-2 tabs every 4-6 hours as needed, you may take oxycodone 5mg every 6hours as needed for severe pain  - If the pain persists after 24 hours contact the office at [phone]    Medications:  - Please continue taking Aspirin 81mg once daily and Plavix 75mg once daily  - Please continue taking your home medications as prescribed, including your propranolol  - pain medications can cause constipation, you should eat a high fiber diet and may take a stool softener while on pain meds     Call the office or come to the ED if:  - wound has drainage or bleeding, increased redness or pain at incisoin site, neurological change, fever (>101), chills, night sweats, syncope, nausea/vomiting, chest pain, shortness of breath    SPECIAL INSTRUCTIONS S/P ANGIOGRAPHY:  Signs and symptoms to look out for:    1. Ty bleeding from the puncture site is an emergency. Put direct pressure on the site and go directly to your local Emergency Room for treatment.    2. Bleeding under the skin may also occur and a small "black and blue" may be expected. If there appears to be an expanding mass or swelling around the puncture site, apply manual compression and go immediately to your local Emergency Room for treatment.    3. If your foot/leg or hand/arm (puncture site) becomes cool or blue and/or you are unable to move it, this must be treated as an emergency. Go directly to your local Emergency Room for treatment.    4. Excessive puncture site pain is abnormal and should be assessed.    5.  Look out for signs of infection in the puncture site: fever, red streaking of the leg or wrist, drainage, severe pain.    6.  Lack of adequate urine output, provided you are drinking enough fluids, may be cause for concern, notify us if you think this is the case.    Playback:  - Discharge instructions are posted on Playback for your reference    WITHIN 24 HOURS OF DISCHARGE, PLEASE CONTACT NEURO PA  WITH ANY QUESTIONS OR CONCERNS: [phone]   OTHERWISE, PLEASE CALL THE OFFICE WITH ANY QUESTIONS OR CONCERNS:  Please call the Neurosurgery office to confirm appointment with Dr. Solomon: [phone]    Groin Wound Care:  - You may remove gauze and tegaderm from your wrist and right groin tomorrow  - steri strips underneath will fall off on their own   - you may shower today  - if you received a "closure device" in your groin, no bathing or swimming for 5 days    Activity  - fatigue is common after surgery, rest if you feel tired   - no bending, lifting, twisting   - walking is recommended, ambulate as tolerated  - you may shower at home/rehab, keep your groin and cranial site incision dry   - no soaking in a tub/pool/hot tub  - no driving within 24 hours of anesthesia or while taking prescription pain medications   - keep hydrated, drink plenty of water   - if your wrist was used to access, do not lift more than 5lbs for 3 days  - if groin access, do not lift more than 10lbs for 5 days    Diet:  - You may resume your regular diet.  - Drinking extra fluids (water, juice) is encouraged.  - Abstain from alcohol for 24 hours.    Please also follow up with your primary care doctor.     Pain Expectations:  - A mild pain at the puncture site is not unusual.  - You may take Tylenol 1-2 tabs every 4-6 hours as needed, you may take oxycodone 5mg every 6 hours as needed for severe pain  - If the pain persists after 24 hours contact the office at [phone]    Medications:  - Please continue taking the following medications: Aspirin 81mg once daily and Plavix 75mg once daily, propranolol 20mg as needed for SVT, epinephrine 0.3mg injectable as needed for shortness of breath   -Decadron (steroid) taper: Decadron 2mg every 6 hours (until 5/21), Decadron 2mg every 8 hours (5/22), Decadron 2mg every 12 hours (5/23), Decadron 2mg once (5/24)     -Pain Medications:   -Tylenol 1,000mg every 8 hours as needed for mild pain, do not exceed maximum daily dose 4,000mg.   -Tramadol 50mg every 6 hours as needed for severe pain (can make you sleepy, constipated)   - pain medications can cause constipation, you should eat a high fiber diet and may take a stool softener while on pain meds     Call the office or come to the ED if:  - wound has drainage or bleeding, increased redness or pain at incision site, neurological change, fever (>101), chills, night sweats, syncope, nausea/vomiting, chest pain, shortness of breath    SPECIAL INSTRUCTIONS S/P ANGIOGRAPHY:  Signs and symptoms to look out for:    1. Ty bleeding from the puncture site is an emergency. Put direct pressure on the site and go directly to your local Emergency Room for treatment.    2. Bleeding under the skin may also occur and a small "black and blue" may be expected. If there appears to be an expanding mass or swelling around the puncture site, apply manual compression and go immediately to your local Emergency Room for treatment.    3. If your foot/leg or hand/arm (puncture site) becomes cool or blue and/or you are unable to move it, this must be treated as an emergency. Go directly to your local Emergency Room for treatment.    4. Excessive puncture site pain is abnormal and should be assessed.    5.  Look out for signs of infection in the puncture site: fever, red streaking of the leg or wrist, drainage, severe pain.    6.  Lack of adequate urine output, provided you are drinking enough fluids, may be cause for concern, notify us if you think this is the case.    Playback:  - Discharge instructions are posted on Playback for your reference    WITHIN 24 HOURS OF DISCHARGE, PLEASE CONTACT NEURO PA  WITH ANY QUESTIONS OR CONCERNS: [phone]   OTHERWISE, PLEASE CALL THE OFFICE WITH ANY QUESTIONS OR CONCERNS: [phone] Please call the Neurosurgery office to confirm appointment with Dr. Solomon: [phone]    Groin Wound Care:  - You may remove gauze and tegaderm from your wrist and right groin tomorrow  - steri strips underneath will fall off on their own   - you may shower today  - if you received a "closure device" in your groin, no bathing or swimming for 5 days    Activity  - fatigue is common after surgery, rest if you feel tired   - no bending, lifting, twisting   - walking is recommended, ambulate as tolerated  - you may shower at home/rehab, keep your groin and cranial site incision dry   - no soaking in a tub/pool/hot tub  - no driving within 24 hours of anesthesia or while taking prescription pain medications   - keep hydrated, drink plenty of water   - if your wrist was used to access, do not lift more than 5lbs for 3 days  - if groin access, do not lift more than 10lbs for 5 days    Diet:  - You may resume your regular diet.  - Drinking extra fluids (water, juice) is encouraged.  - Abstain from alcohol for 24 hours.    Please also follow up with your primary care doctor.     Pain Expectations:  - A mild pain at the puncture site is not unusual.  - You may take Tylenol 1-2 tabs every 4-6 hours as needed, you may take oxycodone 5mg every 6 hours as needed for severe pain  - If the pain persists after 24 hours contact the office at [phone]    Medications:  - Please continue taking the following medications: Aspirin 81mg once daily and Plavix 75mg once daily, propranolol 20mg as needed for SVT, epinephrine 0.3mg injectable as needed for shortness of breath   -Medrol Dosepak (steroid taper): take as prescribed this medication may cause GI upset , take pantoprazole while on this medication     -Pain Medications:   -Tylenol 1,000mg every 8 hours as needed for mild pain, do not exceed maximum daily dose 4,000mg.   -Tramadol 50-100mg every 6 hours as needed for severe pain (can make you sleepy, constipated)   - pain medications can cause constipation, you should eat a high fiber diet and may take a stool softener while on pain meds     Call the office or come to the ED if:  - wound has drainage or bleeding, increased redness or pain at incision site, neurological change, fever (>101), chills, night sweats, syncope, nausea/vomiting, chest pain, shortness of breath    SPECIAL INSTRUCTIONS S/P ANGIOGRAPHY:  Signs and symptoms to look out for:    1. Ty bleeding from the puncture site is an emergency. Put direct pressure on the site and go directly to your local Emergency Room for treatment.    2. Bleeding under the skin may also occur and a small "black and blue" may be expected. If there appears to be an expanding mass or swelling around the puncture site, apply manual compression and go immediately to your local Emergency Room for treatment.    3. If your foot/leg or hand/arm (puncture site) becomes cool or blue and/or you are unable to move it, this must be treated as an emergency. Go directly to your local Emergency Room for treatment.    4. Excessive puncture site pain is abnormal and should be assessed.    5.  Look out for signs of infection in the puncture site: fever, red streaking of the leg or wrist, drainage, severe pain.    6.  Lack of adequate urine output, provided you are drinking enough fluids, may be cause for concern, notify us if you think this is the case.    Playback:  - Discharge instructions are posted on Playback for your reference    WITHIN 24 HOURS OF DISCHARGE, PLEASE CONTACT NEURO PA  WITH ANY QUESTIONS OR CONCERNS: [phone]   OTHERWISE, PLEASE CALL THE OFFICE WITH ANY QUESTIONS OR CONCERNS: [phone] Please call the Neurosurgery office to confirm appointment with Dr. Solomon: [phone]    Groin Wound Care:  - You may remove gauze and tegaderm from your wrist and right groin tomorrow  - steri strips underneath will fall off on their own   - you may shower today  - if you received a "closure device" in your groin, no bathing or swimming for 5 days    Activity  - fatigue is common after surgery, rest if you feel tired   - no bending, lifting, twisting   - walking is recommended, ambulate as tolerated  - you may shower at home/rehab, keep your groin and cranial site incision dry   - no soaking in a tub/pool/hot tub  - no driving within 24 hours of anesthesia or while taking prescription pain medications   - keep hydrated, drink plenty of water   - if your wrist was used to access, do not lift more than 5lbs for 3 days  - if groin access, do not lift more than 10lbs for 5 days    Diet:  - You may resume your regular diet.  - Drinking extra fluids (water, juice) is encouraged.  - Abstain from alcohol for 24 hours.    Please also follow up with your primary care doctor.     Pain Expectations:  - A mild pain at the puncture site is not unusual.  - You may take Tylenol 1-2 tabs every 4-6 hours as needed, you may take oxycodone 5mg every 6 hours as needed for severe pain  - If the pain persists after 24 hours contact the office at [phone]    Medications:  - Please continue taking the following medications: Aspirin 81mg once daily and Plavix 75mg once daily, propranolol 20mg as needed for SVT, epinephrine 0.3mg injectable as needed for shortness of breath   -Medrol Dosepak (steroid taper): take as prescribed this medication may cause GI upset , take pantoprazole while on this medication  -Reglan 5mg every 6 hours as needed for nausea      -Pain Medications:   -Tylenol 1,000mg every 8 hours as needed for mild pain, do not exceed maximum daily dose 4,000mg.   -Tramadol 50-100mg every 6 hours as needed for severe pain (can make you sleepy, constipated)   - pain medications can cause constipation, you should eat a high fiber diet and may take a stool softener while on pain meds     Call the office or come to the ED if:  - wound has drainage or bleeding, increased redness or pain at incision site, neurological change, fever (>101), chills, night sweats, syncope, nausea/vomiting, chest pain, shortness of breath    SPECIAL INSTRUCTIONS S/P ANGIOGRAPHY:  Signs and symptoms to look out for:    1. Ty bleeding from the puncture site is an emergency. Put direct pressure on the site and go directly to your local Emergency Room for treatment.    2. Bleeding under the skin may also occur and a small "black and blue" may be expected. If there appears to be an expanding mass or swelling around the puncture site, apply manual compression and go immediately to your local Emergency Room for treatment.    3. If your foot/leg or hand/arm (puncture site) becomes cool or blue and/or you are unable to move it, this must be treated as an emergency. Go directly to your local Emergency Room for treatment.    4. Excessive puncture site pain is abnormal and should be assessed.    5.  Look out for signs of infection in the puncture site: fever, red streaking of the leg or wrist, drainage, severe pain.    6.  Lack of adequate urine output, provided you are drinking enough fluids, may be cause for concern, notify us if you think this is the case.    Playback:  - Discharge instructions are posted on Playback for your reference    WITHIN 24 HOURS OF DISCHARGE, PLEASE CONTACT NEURO PA  WITH ANY QUESTIONS OR CONCERNS: [phone]   OTHERWISE, PLEASE CALL THE OFFICE WITH ANY QUESTIONS OR CONCERNS: [phone] Please call the Neurosurgery office to confirm appointment with Dr. Solomon: [phone]    Groin Wound Care:  - You may remove gauze and tegaderm from your wrist and right groin tomorrow  - steri strips underneath will fall off on their own   - you may shower today  - if you received a "closure device" in your groin, no bathing or swimming for 5 days    Activity  - fatigue is common after surgery, rest if you feel tired   - no bending, lifting, twisting   - walking is recommended, ambulate as tolerated  - you may shower at home/rehab, keep your groin and cranial site incision dry   - no soaking in a tub/pool/hot tub  - no driving within 24 hours of anesthesia or while taking prescription pain medications   - keep hydrated, drink plenty of water   - if your wrist was used to access, do not lift more than 5lbs for 3 days  - if groin access, do not lift more than 10lbs for 5 days    Diet:  - You may resume your regular diet.  - Drinking extra fluids (water, juice) is encouraged.  - Abstain from alcohol for 24 hours.    Please also follow up with your primary care doctor.     Pain Expectations:  - A mild pain at the puncture site is not unusual.  - You may take Tylenol 1-2 tabs every 4-6 hours as needed, you may take oxycodone 5mg every 6 hours as needed for severe pain  - If the pain persists after 24 hours contact the office at [phone]     Medications:  - Please continue taking the following medications: Aspirin 81mg once daily and Plavix 75mg once daily, propranolol 20mg as needed for SVT, epinephrine 0.3mg injectable as needed for shortness of breath   -Medrol Dosepak (steroid taper): take as prescribed this medication may cause GI upset , take pantoprazole while on this medication  -Reglan 5mg every 6 hours as needed for nausea      -Pain Medications:   -Tylenol 1,000mg every 8 hours as needed for mild pain, do not exceed maximum daily dose 4,000mg.   -Tramadol 50-100mg every 6 hours as needed for severe pain (can make you sleepy, constipated)   - pain medications can cause constipation, you should eat a high fiber diet and may take a stool softener while on pain meds     Call the office or come to the ED if:  - wound has drainage or bleeding, increased redness or pain at incision site, neurological change, fever (>101), chills, night sweats, syncope, nausea/vomiting, chest pain, shortness of breath    SPECIAL INSTRUCTIONS S/P ANGIOGRAPHY:  Signs and symptoms to look out for:    1. Ty bleeding from the puncture site is an emergency. Put direct pressure on the site and go directly to your local Emergency Room for treatment.    2. Bleeding under the skin may also occur and a small "black and blue" may be expected. If there appears to be an expanding mass or swelling around the puncture site, apply manual compression and go immediately to your local Emergency Room for treatment.    3. If your foot/leg or hand/arm (puncture site) becomes cool or blue and/or you are unable to move it, this must be treated as an emergency. Go directly to your local Emergency Room for treatment.    4. Excessive puncture site pain is abnormal and should be assessed.    5.  Look out for signs of infection in the puncture site: fever, red streaking of the leg or wrist, drainage, severe pain.    6.  Lack of adequate urine output, provided you are drinking enough fluids, may be cause for concern, notify us if you think this is the case.    Playback:  - Discharge instructions are posted on Playback for your reference    WITHIN 24 HOURS OF DISCHARGE, PLEASE CONTACT NEURO PA  WITH ANY QUESTIONS OR CONCERNS: [phone]   OTHERWISE, PLEASE CALL THE OFFICE WITH ANY QUESTIONS OR CONCERNS: [phone]

## 2024-05-18 NOTE — DISCHARGE NOTE PROVIDER - NSDCFUSCHEDAPPT_GEN_ALL_CORE_FT
Yoav Solomon  [facility] Physician [facility]  NEUROSURG [address] [facility] [address]  Scheduled Appointment: 06/04/2024

## 2024-05-18 NOTE — DISCHARGE NOTE PROVIDER - NSDCMRMEDTOKEN_GEN_ALL_CORE_FT
aspirin 81 mg oral tablet: orally once a day  EPINEPHrine 0.3 mg injectable kit: 0.3 milligram(s) intramuscularly prn as needed for  shortness of breath and/or wheezing  Plavix 75 mg oral tablet: 1 tab(s) orally once a day  propranolol 20 mg oral tablet: 1 tab(s) orally once a day as needed for  SVT   aspirin 81 mg oral tablet: orally once a day  oxyCODONE 5 mg oral tablet: 1 tab(s) orally every 6 hours as needed for  severe pain MDD: 4 tabs  Plavix 75 mg oral tablet: 1 tab(s) orally once a day  polyethylene glycol 3350 oral powder for reconstitution: 17 gram(s) orally once a day As needed Constipation  propranolol 20 mg oral tablet: 1 tab(s) orally once a day as needed for  SVT   aspirin 81 mg oral tablet: orally once a day  dexAMETHasone 2 mg oral tablet: 2 tab(s) orally every 6 hours Take: Decadron 4mg (2 tabs) every 6 hours (until 5/21), Decadron 2mg (1 tab) every 8 hours (5/22), Decadron 2mg (1 tab) every 12 hours (5/23), Decadron 2mg once (5/24) MDD: 8 tabs  oxyCODONE 5 mg oral tablet: 1 tab(s) orally every 6 hours as needed for  severe pain MDD: 4 tabs  pantoprazole 40 mg oral delayed release tablet: 1 tab(s) orally once a day (before a meal) Take while taking decadron MDD: 1 tab  Plavix 75 mg oral tablet: 1 tab(s) orally once a day  polyethylene glycol 3350 oral powder for reconstitution: 17 gram(s) orally once a day As needed Constipation  propranolol 20 mg oral tablet: 1 tab(s) orally once a day as needed for  SVT   acetaminophen 325 mg oral tablet: 2 tab(s) orally every 6 hours As needed Mild Pain (1 - 3)  aspirin 81 mg oral tablet: orally once a day  methylPREDNISolone 4 mg oral tablet: 4 milligram(s) orally every 6 hours take pack as directed MDD: 4 tabs  Narcan 4 mg/0.1 mL nasal spray: 1 spray(s) intranasally once a day as needed for opiate overdose MDD: 1  pantoprazole 40 mg oral delayed release tablet: 1 tab(s) orally once a day (before a meal) Take while taking decadron MDD: 1 tab  pantoprazole 40 mg oral delayed release tablet: 1 tab(s) orally once a day (before a meal) Take while taking Medrol dosepak MDD: 1 tab  Plavix 75 mg oral tablet: 1 tab(s) orally once a day 1 tab oral once daily  polyethylene glycol 3350 oral powder for reconstitution: 17 gram(s) orally once a day As needed Constipation  propranolol 20 mg oral tablet: 1 tab(s) orally once a day as needed for  SVT  traMADol 50 mg oral tablet: 100 milligram(s) orally every 6 hours as needed for Severe Pain (7 - 10) MDD: 4 tabs   acetaminophen 325 mg oral tablet: 2 tab(s) orally every 6 hours As needed Mild Pain (1 - 3)  aspirin 81 mg oral tablet: orally once a day  methylPREDNISolone 4 mg oral tablet: 4 milligram(s) orally every 6 hours take pack as directed MDD: 4 tabs  Narcan 4 mg/0.1 mL nasal spray: 1 spray(s) intranasally once a day as needed for opiate overdose MDD: 1  pantoprazole 40 mg oral delayed release tablet: 1 tab(s) orally once a day (before a meal) Take while taking decadron MDD: 1 tab  pantoprazole 40 mg oral delayed release tablet: 1 tab(s) orally once a day (before a meal) Take while taking Medrol dosepak MDD: 1 tab  Plavix 75 mg oral tablet: 1 tab(s) orally once a day 1 tab oral once daily  polyethylene glycol 3350 oral powder for reconstitution: 17 gram(s) orally once a day As needed Constipation  propranolol 20 mg oral tablet: 1 tab(s) orally once a day as needed for  SVT  Reglan 5 mg oral tablet: 1 tab(s) orally every 6 hours MDD: 4 tabs  traMADol 50 mg oral tablet: 100 milligram(s) orally every 6 hours as needed for Severe Pain (7 - 10) MDD: 4 tabs
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
## 2024-05-18 NOTE — DISCHARGE NOTE PROVIDER - CARE PROVIDER_API CALL
Yoav Solomon  Interventional Neuroradiology  [address] [place] [address]  Phone: [phone]  Fax: [phone]  Follow Up Time:

## 2024-05-18 NOTE — PROGRESS NOTE ADULT - ASSESSMENT
29y/F with  R supraclinoid, R superior hypophyseal aneurysm, s/p angio / pipeline FDS (05/17/2024, Dr. Solomon)  SVT  asthma  anxiety    PLAN:   NEURO: neurochecks q1h, PRN pain meds with acetaminophen, oxycodone  continue dual antiplatelet therapy  heparin gtt til am as per protocol  groin checks  recheck P2Y12  REHAB:  physical therapy evaluation and management    EARLY MOB:  HOB up    PULM:  PRN O2 support to keep sats >/=92%, incentive spirometry  CARDIO:  SBP goal 100-140mm Hg; PRN propranolol for SVT  ENDO:  Blood sugar goals 140-180 mg/dL, continue insulin sliding scale  GI:  bowel regimen  DIET: regular diet  RENAL:  IVL once eating well  HEM/ONC: check Hb  VTE Prophylaxis: SCDs, heparin gtt  ID: afebrile, no leukocytosis  Social: will update family    Active issues:  What's keeping patient in the ICU? close neurochecks post-procedure  What is this patient's dispo plan?    ATTENDING ATTESTATION:  I was physically present for the key portions of the evaluation and management (E/M) service provided.  I agree with the above history, physical and plan, which I have reviewed and edited where appropriate.    Patient at high risk for neurological deterioration or death due to:  ICU delirium, aspiration PNA, DVT / PE.  Critical care time:  I have personally provided 60 minutes of critical care time, excluding time spent on separate procedures.      Plan discussed with RN, house staff. 29y/F with  R supraclinoid, R superior hypophyseal aneurysm, s/p angio / pipeline FDS (05/17/2024, Dr. Solomon)  SVT  asthma  anxiety    PLAN:   NEURO: neurochecks q4h, PRN pain meds with acetaminophen, oxycodone  continue dual antiplatelet therapy  groin checks  REHAB:  physical therapy evaluation and management    EARLY MOB:  HOB up    PULM:  room air, incentive spirometry  CARDIO:  SBP goal 100-140mm Hg; PRN propranolol for SVT  ENDO:  Blood sugar goals 140-180 mg/dL, d/c insulin sliding scale  GI:  bowel regimen  DIET: regular diet  RENAL:  IVL; 1L fluid bolus   HEM/ONC: Hb stable  VTE Prophylaxis: SCDs, SQL tonight  ID: afebrile, no leukocytosis  Social: family at bedside    Active issues:  What's keeping patient in the ICU? nothing   What is this patient's dispo plan? home if headache improves    ICU stepdown Checklist:    Completed: 05-18 @ 10:45    [X] hemodynamically stable – VS WNL and stable x 24hours, UO adequate  [n/a ] if  previously on HDA - off pressors x 24h with stable neuro exam    [X] no new symptoms x 24h (i.e. new fever, new-onset nausea/vomiting)  [X] stable labs: (i.e. WBC not rising, sodium not dropping)  [X] patient not at high risk for aspiration, if high risk then:                  [ ] should have definitive plans for trach/PEG (alternative option is to discharge from ICU to facilty)                  [ ] stepdown to bed close to nurse’s station  [n/a] low suctioning requirements (i.e. q4h or less)  [X] sign-off from primary DION Isidro  [X] drains do not require ICU level of care  [X] if patient previously agitated or with behavioral issues – controlled   [X] pain controlled   29y/F with  R supraclinoid, R superior hypophyseal aneurysm, s/p angio / pipeline FDS (05/17/2024, Dr. Solomon)  SVT  asthma  anxiety    PLAN:   NEURO: neurochecks q4h, PRN pain meds with acetaminophen, oxycodone  continue dual antiplatelet therapy  groin checks  REHAB:  physical therapy evaluation and management    EARLY MOB:  HOB up    PULM:  room air, incentive spirometry  CARDIO:  SBP goal 100-140mm Hg; PRN propranolol for SVT  ENDO:  Blood sugar goals 140-180 mg/dL, d/c insulin sliding scale  GI:  bowel regimen  DIET: regular diet  RENAL:  IVL; 1L fluid bolus   HEM/ONC: Hb stable  VTE Prophylaxis: SCDs, SQL tonight  ID: afebrile, no leukocytosis  Social: family at bedside    Active issues:  What's keeping patient in the ICU? nothing   What is this patient's dispo plan? home if headache improves    ICU stepdown Checklist:    Completed: 05-18 @ 10:45    [X] hemodynamically stable – VS WNL and stable x 24hours, UO adequate  [n/a ] if  previously on HDA - off pressors x 24h with stable neuro exam    [X] no new symptoms x 24h (i.e. new fever, new-onset nausea/vomiting)  [X] stable labs: (i.e. WBC not rising, sodium not dropping)  [X] patient not at high risk for aspiration, if high risk then:                  [ ] should have definitive plans for trach/PEG (alternative option is to discharge from ICU to facilty)                  [ ] stepdown to bed close to nurse’s station  [n/a] low suctioning requirements (i.e. q4h or less)  [X] sign-off from primary RNAdalberto Isidro  [X] drains do not require ICU level of care  [X] if patient previously agitated or with behavioral issues – controlled   [X] pain controlled      ATTENDING ATTESTATION:  I was physically present for the key portions of the evaluation and management (E/M) service provided.  I agree with the above history, physical and plan which I have reviewed and edited where appropriate.     Patient not at high risk for neurologic deterioration / death.  Time spent on this noncritically ill patient: 45 minutes spent on total encounter, more than 50% of the visit was spent counseling and/or coordinating care by the attending physician.    Plan discussed with RN, house staff.    REVIEW OF SYSTEMS:  No headaches, no nausea or vomiting; 14 -point review of systems otherwise unremarkable.

## 2024-05-18 NOTE — DISCHARGE NOTE PROVIDER - NSDCFUADDAPPT_GEN_ALL_CORE_FT
Please follow-up with your primary care doctor Please follow up with Dr. Solomon in the outpatient office. Call [phone] to confirm appointment.     Please follow-up with your primary care doctor. Please follow up with Dr. Solomon in the outpatient office on 6/4 at 10:00AM Call [phone] to confirm appointment.     Please follow-up with your primary care doctor.

## 2024-05-19 LAB
ANION GAP SERPL CALC-SCNC: 11 MMOL/L — SIGNIFICANT CHANGE UP (ref 5–17)
BUN SERPL-MCNC: 8 MG/DL — SIGNIFICANT CHANGE UP (ref 7–23)
CALCIUM SERPL-MCNC: 9.4 MG/DL — SIGNIFICANT CHANGE UP (ref 8.4–10.5)
CHLORIDE SERPL-SCNC: 104 MMOL/L — SIGNIFICANT CHANGE UP (ref 96–108)
CO2 SERPL-SCNC: 23 MMOL/L — SIGNIFICANT CHANGE UP (ref 22–31)
CREAT SERPL-MCNC: 0.49 MG/DL — LOW (ref 0.5–1.3)
EGFR: 132 ML/MIN/1.73M2 — SIGNIFICANT CHANGE UP
GLUCOSE SERPL-MCNC: 117 MG/DL — HIGH (ref 70–99)
HCT VFR BLD CALC: 36.5 % — SIGNIFICANT CHANGE UP (ref 34.5–45)
HGB BLD-MCNC: 11.9 G/DL — SIGNIFICANT CHANGE UP (ref 11.5–15.5)
MAGNESIUM SERPL-MCNC: 1.9 MG/DL — SIGNIFICANT CHANGE UP (ref 1.6–2.6)
MCHC RBC-ENTMCNC: 30.7 PG — SIGNIFICANT CHANGE UP (ref 27–34)
MCHC RBC-ENTMCNC: 32.6 GM/DL — SIGNIFICANT CHANGE UP (ref 32–36)
MCV RBC AUTO: 94.1 FL — SIGNIFICANT CHANGE UP (ref 80–100)
NRBC # BLD: 0 /100 WBCS — SIGNIFICANT CHANGE UP (ref 0–0)
PHOSPHATE SERPL-MCNC: 3.7 MG/DL — SIGNIFICANT CHANGE UP (ref 2.5–4.5)
PLATELET # BLD AUTO: 234 K/UL — SIGNIFICANT CHANGE UP (ref 150–400)
POTASSIUM SERPL-MCNC: 4 MMOL/L — SIGNIFICANT CHANGE UP (ref 3.5–5.3)
POTASSIUM SERPL-SCNC: 4 MMOL/L — SIGNIFICANT CHANGE UP (ref 3.5–5.3)
RBC # BLD: 3.88 M/UL — SIGNIFICANT CHANGE UP (ref 3.8–5.2)
RBC # FLD: 12.6 % — SIGNIFICANT CHANGE UP (ref 10.3–14.5)
SODIUM SERPL-SCNC: 138 MMOL/L — SIGNIFICANT CHANGE UP (ref 135–145)
WBC # BLD: 7.75 K/UL — SIGNIFICANT CHANGE UP (ref 3.8–10.5)
WBC # FLD AUTO: 7.75 K/UL — SIGNIFICANT CHANGE UP (ref 3.8–10.5)

## 2024-05-19 PROCEDURE — 99233 SBSQ HOSP IP/OBS HIGH 50: CPT

## 2024-05-19 RX ORDER — SODIUM CHLORIDE 9 MG/ML
1000 INJECTION INTRAMUSCULAR; INTRAVENOUS; SUBCUTANEOUS ONCE
Refills: 0 | Status: COMPLETED | OUTPATIENT
Start: 2024-05-19 | End: 2024-05-19

## 2024-05-19 RX ORDER — OXYCODONE HYDROCHLORIDE 5 MG/1
5 TABLET ORAL ONCE
Refills: 0 | Status: DISCONTINUED | OUTPATIENT
Start: 2024-05-19 | End: 2024-05-19

## 2024-05-19 RX ORDER — POLYETHYLENE GLYCOL 3350 17 G/17G
17 POWDER, FOR SOLUTION ORAL DAILY
Refills: 0 | Status: DISCONTINUED | OUTPATIENT
Start: 2024-05-19 | End: 2024-05-20

## 2024-05-19 RX ORDER — ACETAMINOPHEN 500 MG
650 TABLET ORAL ONCE
Refills: 0 | Status: COMPLETED | OUTPATIENT
Start: 2024-05-19 | End: 2024-05-19

## 2024-05-19 RX ADMIN — TRAMADOL HYDROCHLORIDE 100 MILLIGRAM(S): 50 TABLET ORAL at 18:00

## 2024-05-19 RX ADMIN — CLOPIDOGREL BISULFATE 75 MILLIGRAM(S): 75 TABLET, FILM COATED ORAL at 06:13

## 2024-05-19 RX ADMIN — ENOXAPARIN SODIUM 40 MILLIGRAM(S): 100 INJECTION SUBCUTANEOUS at 21:03

## 2024-05-19 RX ADMIN — Medication 81 MILLIGRAM(S): at 06:14

## 2024-05-19 RX ADMIN — TRAMADOL HYDROCHLORIDE 100 MILLIGRAM(S): 50 TABLET ORAL at 06:29

## 2024-05-19 RX ADMIN — TRAMADOL HYDROCHLORIDE 100 MILLIGRAM(S): 50 TABLET ORAL at 06:16

## 2024-05-19 RX ADMIN — TRAMADOL HYDROCHLORIDE 100 MILLIGRAM(S): 50 TABLET ORAL at 16:33

## 2024-05-19 RX ADMIN — OXYCODONE HYDROCHLORIDE 5 MILLIGRAM(S): 5 TABLET ORAL at 07:49

## 2024-05-19 RX ADMIN — SENNA PLUS 1 TABLET(S): 8.6 TABLET ORAL at 21:03

## 2024-05-19 RX ADMIN — Medication 4 MILLIGRAM(S): at 21:03

## 2024-05-19 RX ADMIN — SODIUM CHLORIDE 1000 MILLILITER(S): 9 INJECTION INTRAMUSCULAR; INTRAVENOUS; SUBCUTANEOUS at 00:23

## 2024-05-19 RX ADMIN — CHLORHEXIDINE GLUCONATE 1 APPLICATION(S): 213 SOLUTION TOPICAL at 06:15

## 2024-05-19 RX ADMIN — Medication 15 MILLIGRAM(S): at 16:00

## 2024-05-19 RX ADMIN — Medication 4 MILLIGRAM(S): at 12:26

## 2024-05-19 RX ADMIN — Medication 4 MILLIGRAM(S): at 06:13

## 2024-05-19 RX ADMIN — Medication 650 MILLIGRAM(S): at 15:25

## 2024-05-19 RX ADMIN — OXYCODONE HYDROCHLORIDE 5 MILLIGRAM(S): 5 TABLET ORAL at 07:24

## 2024-05-19 RX ADMIN — PANTOPRAZOLE SODIUM 40 MILLIGRAM(S): 20 TABLET, DELAYED RELEASE ORAL at 06:14

## 2024-05-19 RX ADMIN — Medication 650 MILLIGRAM(S): at 14:12

## 2024-05-19 RX ADMIN — SODIUM CHLORIDE 1000 MILLILITER(S): 9 INJECTION INTRAMUSCULAR; INTRAVENOUS; SUBCUTANEOUS at 10:32

## 2024-05-19 RX ADMIN — Medication 15 MILLIGRAM(S): at 15:12
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
## 2024-05-19 NOTE — PROVIDER CONTACT NOTE (OTHER) - SITUATION
Pt has ST-elevation, VSS, Pt denies chest pain and SOB, Neuro JUN Palacio made aware, EKG done and reviewed by Neuro JUN Palacio, no intervention, will continue monitor.

## 2024-05-19 NOTE — PROGRESS NOTE ADULT - ASSESSMENT
29y/F with  R supraclinoid, R superior hypophyseal aneurysm, s/p angio / pipeline FDS (05/17/2024, Dr. Solomon)  SVT  asthma  anxiety    PLAN:   NEURO: neurochecks q4h, PRN pain meds with acetaminophen, oxycodone  continue dual antiplatelet therapy  groin checks  REHAB:  physical therapy evaluation and management    EARLY MOB:  HOB up    PULM:  room air, incentive spirometry  CARDIO:  SBP goal 100-140mm Hg; PRN propranolol for SVT  ENDO:  Blood sugar goals 140-180 mg/dL, d/c insulin sliding scale  GI:  bowel regimen  DIET: regular diet  RENAL:  IVL; 1L fluid bolus   HEM/ONC: Hb stable  VTE Prophylaxis: SCDs, SQL tonight  ID: afebrile, no leukocytosis  Social: family at bedside    Active issues:  What's keeping patient in the ICU? nothing   What is this patient's dispo plan? home if headache improves    ICU stepdown Checklist:    Completed: 05-18 @ 10:45    [X] hemodynamically stable – VS WNL and stable x 24hours, UO adequate  [n/a ] if  previously on HDA - off pressors x 24h with stable neuro exam    [X] no new symptoms x 24h (i.e. new fever, new-onset nausea/vomiting)  [X] stable labs: (i.e. WBC not rising, sodium not dropping)  [X] patient not at high risk for aspiration, if high risk then:                  [ ] should have definitive plans for trach/PEG (alternative option is to discharge from ICU to facilty)                  [ ] stepdown to bed close to nurse’s station  [n/a] low suctioning requirements (i.e. q4h or less)  [X] sign-off from primary DION Isidro  [X] drains do not require ICU level of care  [X] if patient previously agitated or with behavioral issues – controlled   [X] pain controlled   29y/F with  R supraclinoid, R superior hypophyseal aneurysm, s/p angio / pipeline FDS (05/17/2024, Dr. Solomon)  SVT  asthma  anxiety    PLAN:   NEURO: neurochecks q4h, PRN pain meds with acetaminophen, oxycodone, ketorolac  continue dual antiplatelet therapy  groin checks  REHAB:  physical therapy evaluation and management    EARLY MOB:  HOB up OOB to chair, ambulate    PULM:  room air, incentive spirometry  CARDIO:  SBP goal 100-140mm Hg; PRN propranolol for SVT  ENDO:  Blood sugar goals 140-180 mg/dL  GI:  bowel regimen  DIET: regular diet  RENAL:  IVL; 1L fluid bolus  HEM/ONC: Hb stable  VTE Prophylaxis: SCDs, SQL   ID: afebrile, no leukocytosis  Social: family at bedside    Active issues:  What's keeping patient in the ICU? nothing   What is this patient's dispo plan? home if headache improves    ICU stepdown Checklist:    Completed: 05-18 @ 10:45    [X] hemodynamically stable – VS WNL and stable x 24hours, UO adequate  [n/a ] if  previously on HDA - off pressors x 24h with stable neuro exam    [X] no new symptoms x 24h (i.e. new fever, new-onset nausea/vomiting)  [X] stable labs: (i.e. WBC not rising, sodium not dropping)  [X] patient not at high risk for aspiration, if high risk then:                  [ ] should have definitive plans for trach/PEG (alternative option is to discharge from ICU to facilty)                  [ ] stepdown to bed close to nurse’s station  [n/a] low suctioning requirements (i.e. q4h or less)  [X] sign-off from primary RNAdalberto Isidro  [X] drains do not require ICU level of care  [X] if patient previously agitated or with behavioral issues – controlled   [X] pain controlled    ATTENDING ATTESTATION:  I was physically present for the key portions of the evaluation and management (E/M) service provided.  I agree with the above history, physical and plan which I have reviewed and edited where appropriate.     Patient not at high risk for neurologic deterioration / death.  Time spent on this noncritically ill patient: 45 minutes spent on total encounter, more than 50% of the visit was spent counseling and/or coordinating care by the attending physician.    Plan discussed with RN, house staff.    REVIEW OF SYSTEMS:  No headaches, no nausea or vomiting; 14 -point review of systems otherwise unremarkable.

## 2024-05-20 ENCOUNTER — TRANSCRIPTION ENCOUNTER (OUTPATIENT)
Age: 29
End: 2024-05-20

## 2024-05-20 VITALS
OXYGEN SATURATION: 98 % | RESPIRATION RATE: 18 BRPM | SYSTOLIC BLOOD PRESSURE: 104 MMHG | DIASTOLIC BLOOD PRESSURE: 60 MMHG | HEART RATE: 72 BPM

## 2024-05-20 PROBLEM — I47.10 SUPRAVENTRICULAR TACHYCARDIA, UNSPECIFIED: Chronic | Status: ACTIVE | Noted: 2024-05-17

## 2024-05-20 PROBLEM — F41.9 ANXIETY DISORDER, UNSPECIFIED: Chronic | Status: ACTIVE | Noted: 2024-05-17

## 2024-05-20 PROBLEM — J45.909 UNSPECIFIED ASTHMA, UNCOMPLICATED: Chronic | Status: ACTIVE | Noted: 2024-05-17

## 2024-05-20 PROCEDURE — C1887: CPT

## 2024-05-20 PROCEDURE — 70498 CT ANGIOGRAPHY NECK: CPT | Mod: MC

## 2024-05-20 PROCEDURE — 83735 ASSAY OF MAGNESIUM: CPT

## 2024-05-20 PROCEDURE — 85027 COMPLETE CBC AUTOMATED: CPT

## 2024-05-20 PROCEDURE — 85576 BLOOD PLATELET AGGREGATION: CPT

## 2024-05-20 PROCEDURE — 36415 COLL VENOUS BLD VENIPUNCTURE: CPT

## 2024-05-20 PROCEDURE — C1769: CPT

## 2024-05-20 PROCEDURE — 80053 COMPREHEN METABOLIC PANEL: CPT

## 2024-05-20 PROCEDURE — C9399: CPT

## 2024-05-20 PROCEDURE — 85610 PROTHROMBIN TIME: CPT

## 2024-05-20 PROCEDURE — 85347 COAGULATION TIME ACTIVATED: CPT

## 2024-05-20 PROCEDURE — 70450 CT HEAD/BRAIN W/O DYE: CPT | Mod: MC

## 2024-05-20 PROCEDURE — 70496 CT ANGIOGRAPHY HEAD: CPT | Mod: MC

## 2024-05-20 PROCEDURE — 84100 ASSAY OF PHOSPHORUS: CPT

## 2024-05-20 PROCEDURE — C1889: CPT

## 2024-05-20 PROCEDURE — C1760: CPT

## 2024-05-20 PROCEDURE — 99223 1ST HOSP IP/OBS HIGH 75: CPT

## 2024-05-20 PROCEDURE — 99233 SBSQ HOSP IP/OBS HIGH 50: CPT

## 2024-05-20 PROCEDURE — 81025 URINE PREGNANCY TEST: CPT

## 2024-05-20 PROCEDURE — 80048 BASIC METABOLIC PNL TOTAL CA: CPT

## 2024-05-20 PROCEDURE — 85730 THROMBOPLASTIN TIME PARTIAL: CPT

## 2024-05-20 PROCEDURE — 99232 SBSQ HOSP IP/OBS MODERATE 35: CPT

## 2024-05-20 RX ORDER — DEXAMETHASONE 0.5 MG/5ML
2 ELIXIR ORAL
Qty: 14 | Refills: 0
Start: 2024-05-20 | End: 2024-05-21

## 2024-05-20 RX ORDER — CLOPIDOGREL BISULFATE 75 MG/1
1 TABLET, FILM COATED ORAL
Qty: 0 | Refills: 0 | DISCHARGE

## 2024-05-20 RX ORDER — SODIUM CHLORIDE 9 MG/ML
1000 INJECTION INTRAMUSCULAR; INTRAVENOUS; SUBCUTANEOUS ONCE
Refills: 0 | Status: COMPLETED | OUTPATIENT
Start: 2024-05-20 | End: 2024-05-20

## 2024-05-20 RX ORDER — PANTOPRAZOLE SODIUM 20 MG/1
1 TABLET, DELAYED RELEASE ORAL
Qty: 7 | Refills: 0
Start: 2024-05-20 | End: 2024-05-26

## 2024-05-20 RX ORDER — PANTOPRAZOLE SODIUM 20 MG/1
1 TABLET, DELAYED RELEASE ORAL
Qty: 5 | Refills: 0
Start: 2024-05-20 | End: 2024-05-24

## 2024-05-20 RX ORDER — ACETAMINOPHEN 500 MG
650 TABLET ORAL EVERY 6 HOURS
Refills: 0 | Status: DISCONTINUED | OUTPATIENT
Start: 2024-05-20 | End: 2024-05-20

## 2024-05-20 RX ORDER — OXYCODONE HYDROCHLORIDE 5 MG/1
5 TABLET ORAL ONCE
Refills: 0 | Status: DISCONTINUED | OUTPATIENT
Start: 2024-05-20 | End: 2024-05-20

## 2024-05-20 RX ORDER — TRAMADOL HYDROCHLORIDE 50 MG/1
100 TABLET ORAL
Qty: 40 | Refills: 0
Start: 2024-05-20 | End: 2024-05-24

## 2024-05-20 RX ORDER — METOCLOPRAMIDE HCL 10 MG
1 TABLET ORAL
Qty: 28 | Refills: 0
Start: 2024-05-20 | End: 2024-05-26

## 2024-05-20 RX ORDER — ACETAMINOPHEN 500 MG
2 TABLET ORAL
Qty: 0 | Refills: 0 | DISCHARGE
Start: 2024-05-20

## 2024-05-20 RX ORDER — NALOXONE HYDROCHLORIDE 4 MG/.1ML
1 SPRAY NASAL
Qty: 1 | Refills: 0
Start: 2024-05-20 | End: 2024-05-20

## 2024-05-20 RX ORDER — ACETAMINOPHEN 500 MG
1000 TABLET ORAL ONCE
Refills: 0 | Status: COMPLETED | OUTPATIENT
Start: 2024-05-20 | End: 2024-05-20

## 2024-05-20 RX ORDER — ASPIRIN/CALCIUM CARB/MAGNESIUM 324 MG
0 TABLET ORAL
Qty: 0 | Refills: 0 | DISCHARGE

## 2024-05-20 RX ADMIN — PANTOPRAZOLE SODIUM 40 MILLIGRAM(S): 20 TABLET, DELAYED RELEASE ORAL at 05:17

## 2024-05-20 RX ADMIN — OXYCODONE HYDROCHLORIDE 5 MILLIGRAM(S): 5 TABLET ORAL at 05:43

## 2024-05-20 RX ADMIN — Medication 4 MILLIGRAM(S): at 05:17

## 2024-05-20 RX ADMIN — OXYCODONE HYDROCHLORIDE 5 MILLIGRAM(S): 5 TABLET ORAL at 15:22

## 2024-05-20 RX ADMIN — SODIUM CHLORIDE 1000 MILLILITER(S): 9 INJECTION INTRAMUSCULAR; INTRAVENOUS; SUBCUTANEOUS at 11:25

## 2024-05-20 RX ADMIN — TRAMADOL HYDROCHLORIDE 100 MILLIGRAM(S): 50 TABLET ORAL at 09:00

## 2024-05-20 RX ADMIN — POLYETHYLENE GLYCOL 3350 17 GRAM(S): 17 POWDER, FOR SOLUTION ORAL at 11:25

## 2024-05-20 RX ADMIN — Medication 4 MILLIGRAM(S): at 14:40

## 2024-05-20 RX ADMIN — TRAMADOL HYDROCHLORIDE 100 MILLIGRAM(S): 50 TABLET ORAL at 10:00

## 2024-05-20 RX ADMIN — Medication 1000 MILLIGRAM(S): at 10:55

## 2024-05-20 RX ADMIN — OXYCODONE HYDROCHLORIDE 5 MILLIGRAM(S): 5 TABLET ORAL at 04:43

## 2024-05-20 RX ADMIN — Medication 1000 MILLIGRAM(S): at 12:00

## 2024-05-20 RX ADMIN — Medication 81 MILLIGRAM(S): at 05:17

## 2024-05-20 RX ADMIN — CLOPIDOGREL BISULFATE 75 MILLIGRAM(S): 75 TABLET, FILM COATED ORAL at 05:17
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
## 2024-05-20 NOTE — PROGRESS NOTE ADULT - REASON FOR ADMISSION
Elective Pipeline stent placement of right supraclinoid ICA aneurysm

## 2024-05-20 NOTE — DISCHARGE NOTE NURSING/CASE MANAGEMENT/SOCIAL WORK - PATIENT PORTAL LINK FT
You can access the FollowMyHealth Patient Portal offered by [facility] by registering at the following website: [email or web address]. By joining [facility]’s FollowMyHealth portal, you will also be able to view your health information using other applications (apps) compatible with our system.
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
## 2024-05-20 NOTE — DISCHARGE NOTE NURSING/CASE MANAGEMENT/SOCIAL WORK - NSDCFUADDAPPT_GEN_ALL_CORE_FT
Please follow up with Dr. Solomon in the outpatient office. Call [phone] to confirm appointment.     Please follow-up with your primary care doctor.

## 2024-06-03 PROBLEM — Z95.9 S/P ARTERIAL STENT: Status: ACTIVE | Noted: 2024-06-03

## 2024-06-03 PROBLEM — I67.1 NONRUPTURED CEREBRAL ANEURYSM, INTERNAL CAROTID ARTERY: Status: ACTIVE | Noted: 2024-04-04

## 2024-06-04 ENCOUNTER — APPOINTMENT (OUTPATIENT)
Dept: NEUROSURGERY | Facility: CLINIC | Age: 29
End: 2024-06-04
Payer: COMMERCIAL

## 2024-06-04 VITALS
DIASTOLIC BLOOD PRESSURE: 70 MMHG | WEIGHT: 110 LBS | BODY MASS INDEX: 18.78 KG/M2 | TEMPERATURE: 97.7 F | HEART RATE: 112 BPM | OXYGEN SATURATION: 99 % | SYSTOLIC BLOOD PRESSURE: 106 MMHG | RESPIRATION RATE: 18 BRPM | HEIGHT: 64 IN

## 2024-06-04 DIAGNOSIS — J45.909 UNSPECIFIED ASTHMA, UNCOMPLICATED: ICD-10-CM

## 2024-06-04 DIAGNOSIS — Z88.8 ALLERGY STATUS TO OTHER DRUGS, MEDICAMENTS AND BIOLOGICAL SUBSTANCES: ICD-10-CM

## 2024-06-04 DIAGNOSIS — Z95.9 PRESENCE OF CARDIAC AND VASCULAR IMPLANT AND GRAFT, UNSPECIFIED: ICD-10-CM

## 2024-06-04 DIAGNOSIS — I67.1 CEREBRAL ANEURYSM, NONRUPTURED: ICD-10-CM

## 2024-06-04 DIAGNOSIS — G43.909 MIGRAINE, UNSPECIFIED, NOT INTRACTABLE, WITHOUT STATUS MIGRAINOSUS: ICD-10-CM

## 2024-06-04 DIAGNOSIS — F41.9 ANXIETY DISORDER, UNSPECIFIED: ICD-10-CM

## 2024-06-04 DIAGNOSIS — Z91.013 ALLERGY TO SEAFOOD: ICD-10-CM

## 2024-06-04 DIAGNOSIS — F32.A DEPRESSION, UNSPECIFIED: ICD-10-CM

## 2024-06-04 PROCEDURE — 99215 OFFICE O/P EST HI 40 MIN: CPT

## 2024-06-04 RX ORDER — DIVALPROEX SODIUM 500 MG/1
TABLET, DELAYED RELEASE ORAL
Refills: 0 | Status: ACTIVE | COMMUNITY

## 2024-06-04 NOTE — PHYSICAL EXAM
[General Appearance - Alert] : alert [General Appearance - In No Acute Distress] : in no acute distress [General Appearance - Well Nourished] : well nourished [General Appearance - Well Developed] : well developed [Oriented To Time, Place, And Person] : oriented to person, place, and time [Impaired Insight] : insight and judgment were intact [Affect] : the affect was normal [Mood] : the mood was normal [Motor Tone] : muscle tone was normal in all four extremities [Motor Strength] : muscle strength was normal in all four extremities [Sensation Tactile Decrease] : light touch was intact [Balance] : balance was intact [Sclera] : the sclera and conjunctiva were normal [Extraocular Movements] : extraocular movements were intact [Full Visual Field] : full visual field [Outer Ear] : the ears and nose were normal in appearance [Neck Appearance] : the appearance of the neck was normal [] : no respiratory distress [Heart Rate And Rhythm] : heart rate was normal and rhythm regular [Abnormal Walk] : normal gait [Skin Color & Pigmentation] : normal skin color and pigmentation
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
## 2024-06-09 NOTE — REASON FOR VISIT
[Follow-Up: _____] : a [unfilled] follow-up visit [Post Op: _________] : a [unfilled] post op visit [FreeTextEntry1] : s/p 1 Flow diverter stent to Right paraophthalmic and cavernous ICA aneurysms
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
## 2024-06-09 NOTE — ASSESSMENT
[FreeTextEntry1] : NELI GANDARA is a 28 year-old female with a PMHx significant sinus tachycardia for who presents to Dr. Solomon office today for postop evaluation s/p Flow diverter stent to Right Paraophthalmic and cavernous ICA aneurysms.  PLAN: - continue DAPT (ASA + Plavix) for 6 mo.  - 3 mo MRA Brain due Aug 2024 - return to Dr. Solomon clinic to review   The patient was instructed that if they should immediately call 911 or go to the Emergency Department if they experience symptoms of severe thunderclap headache, syncope, unexplained nausea/vomiting, visual changes, seizure-like activity, new weakness or numbness of extremities.   Patient verbalizes agreement and understanding with plan of care.  I, Dr. Solomon, personally performed the evaluation and management (E/M) services for this established patient who presents today with (a) new problem(s)/exacerbation of (an) existing condition(s). That E/M includes conducting the examination, assessing all new/exacerbated conditions, and establishing a new plan of care. Today, NAHEED Vale, was here to observe my evaluation and management services for this new problem/exacerbated condition to be followed going forward.

## 2024-06-09 NOTE — REVIEW OF SYSTEMS
[As Noted in HPI] : as noted in HPI [Negative] : Heme/Lymph [de-identified] : 2-9 out of 10 headaches (migraine is on the Right) [FreeTextEntry3] : unchanged white floaters in b/l eyes  [FreeTextEntry7] : Constipation resolved

## 2024-07-22 ENCOUNTER — APPOINTMENT (OUTPATIENT)
Dept: MRI IMAGING | Facility: CLINIC | Age: 29
End: 2024-07-22
Payer: COMMERCIAL

## 2024-07-22 PROCEDURE — 70544 MR ANGIOGRAPHY HEAD W/O DYE: CPT | Mod: 26

## 2024-07-25 ENCOUNTER — APPOINTMENT (OUTPATIENT)
Dept: NEUROSURGERY | Facility: CLINIC | Age: 29
End: 2024-07-25
Payer: COMMERCIAL

## 2024-07-25 PROCEDURE — 99215 OFFICE O/P EST HI 40 MIN: CPT

## 2024-07-25 RX ORDER — ASPIRIN ENTERIC COATED TABLETS 81 MG 81 MG/1
81 TABLET, DELAYED RELEASE ORAL DAILY
Qty: 30 | Refills: 6 | Status: ACTIVE | COMMUNITY
Start: 2024-07-25 | End: 1900-01-01

## 2024-07-25 NOTE — REASON FOR VISIT
[Home] : at home, [unfilled] , at the time of the visit. [Medical Office: ([facility])___] : at the medical office located in  [Patient] : the patient [Self] : self [Family Member] : family member [FreeTextEntry1] : Today she returns to review MRA brain wo (PACS, pending radiology reading) and reports persistent intermittent headache (currently taking low dose of Nortriptyline with minimal relief) but previous symptoms of white floaters on b/l eyes and post concussive symptoms has been improved. She denies any other focal neuro deficits.

## 2024-07-25 NOTE — ASSESSMENT
[FreeTextEntry1] : MRA reviewed which showed stable stent and no recurrent aneurysm  PLAN - continue ASA 81mg, Plavix 75mg for 6 months - repeat diagnostic cerebral angiogram in 11/2024 (to reevaluate stent and aneurysm) and further DAPT plan will be made based on the result, discussed possible surveillance image plan of MRA head wo in 5 years if angiogram shows stable post op - rheumatology referral for connective tissue evaluation for aneurysm etiology - Advised no heavy alcohol intake given risk of aneurysm/rupture but no contraindication for travel/long flight   I, Dr. Solomon, personally performed the evaluation and management (E/M) services for this new patient. That E/M includes conducting the initial examination, assessing all conditions, and establishing the plan of care. Today, Bhavna Reed ACP was here to observe my evaluation and management services for this patient to be followed going forward.

## 2024-07-25 NOTE — REASON FOR VISIT
[Home] : at home, [unfilled] , at the time of the visit. [Medical Office: ([facility])___] : at the medical office located in  [Patient] : the patient [Self] : self [Family Member] : family member [FreeTextEntry1] : Today she returns to review MRA brain wo (PACS, pending radiology reading) and reports persistent intermittent headache (currently taking low dose of Nortriptyline with minimal relief) but previous symptoms of white floaters on b/l eyes and post concussive symptoms has been improved. She denies any other focal neuro deficits.
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
## 2024-07-25 NOTE — PHYSICAL EXAM
[General Appearance - Alert] : alert [General Appearance - In No Acute Distress] : in no acute distress [General Appearance - Well Nourished] : well nourished [General Appearance - Well-Appearing] : healthy appearing [] : normal voice and communication [Oriented To Time, Place, And Person] : oriented to person, place, and time [Impaired Insight] : insight and judgment were intact [Affect] : the affect was normal [Memory Recent] : recent memory was not impaired
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
## 2024-07-29 ENCOUNTER — RX RENEWAL (OUTPATIENT)
Age: 29
End: 2024-07-29

## 2024-10-09 ENCOUNTER — APPOINTMENT (OUTPATIENT)
Dept: NEUROSURGERY | Facility: CLINIC | Age: 29
End: 2024-10-09
Payer: COMMERCIAL

## 2024-10-09 DIAGNOSIS — I67.1 CEREBRAL ANEURYSM, NONRUPTURED: ICD-10-CM

## 2024-10-09 DIAGNOSIS — Z95.9 PRESENCE OF CARDIAC AND VASCULAR IMPLANT AND GRAFT, UNSPECIFIED: ICD-10-CM

## 2024-10-09 DIAGNOSIS — S09.90XS POST-TRAUMATIC HEADACHE, UNSPECIFIED, NOT INTRACTABLE: ICD-10-CM

## 2024-10-09 DIAGNOSIS — G44.309 POST-TRAUMATIC HEADACHE, UNSPECIFIED, NOT INTRACTABLE: ICD-10-CM

## 2024-10-09 PROCEDURE — 99215 OFFICE O/P EST HI 40 MIN: CPT

## 2024-10-09 RX ORDER — ESCITALOPRAM OXALATE 10 MG/1
10 TABLET, FILM COATED ORAL
Refills: 0 | Status: ACTIVE | COMMUNITY

## 2024-10-18 ENCOUNTER — RX RENEWAL (OUTPATIENT)
Age: 29
End: 2024-10-18

## 2024-10-24 ENCOUNTER — APPOINTMENT (OUTPATIENT)
Dept: RHEUMATOLOGY | Facility: CLINIC | Age: 29
End: 2024-10-24
Payer: COMMERCIAL

## 2024-10-24 ENCOUNTER — LABORATORY RESULT (OUTPATIENT)
Age: 29
End: 2024-10-24

## 2024-10-24 VITALS
WEIGHT: 124 LBS | OXYGEN SATURATION: 99 % | HEART RATE: 74 BPM | BODY MASS INDEX: 21.17 KG/M2 | HEIGHT: 64 IN | SYSTOLIC BLOOD PRESSURE: 108 MMHG | TEMPERATURE: 97.5 F | DIASTOLIC BLOOD PRESSURE: 69 MMHG

## 2024-10-24 DIAGNOSIS — L65.9 NONSCARRING HAIR LOSS, UNSPECIFIED: ICD-10-CM

## 2024-10-24 DIAGNOSIS — I67.1 CEREBRAL ANEURYSM, NONRUPTURED: ICD-10-CM

## 2024-10-24 DIAGNOSIS — R53.83 OTHER FATIGUE: ICD-10-CM

## 2024-10-24 DIAGNOSIS — Z82.61 FAMILY HISTORY OF ARTHRITIS: ICD-10-CM

## 2024-10-24 DIAGNOSIS — Z78.9 OTHER SPECIFIED HEALTH STATUS: ICD-10-CM

## 2024-10-24 PROCEDURE — 99204 OFFICE O/P NEW MOD 45 MIN: CPT

## 2024-10-24 PROCEDURE — G2211 COMPLEX E/M VISIT ADD ON: CPT | Mod: NC

## 2024-10-24 PROCEDURE — 36415 COLL VENOUS BLD VENIPUNCTURE: CPT

## 2024-10-25 LAB
ALBUMIN SERPL ELPH-MCNC: 4.6 G/DL
ALP BLD-CCNC: 46 U/L
ALT SERPL-CCNC: 11 U/L
ANION GAP SERPL CALC-SCNC: 14 MMOL/L
AST SERPL-CCNC: 16 U/L
BASOPHILS # BLD AUTO: 0.02 K/UL
BASOPHILS NFR BLD AUTO: 0.4 %
BILIRUB SERPL-MCNC: 0.3 MG/DL
BUN SERPL-MCNC: 12 MG/DL
CALCIUM SERPL-MCNC: 9.9 MG/DL
CCP AB SER IA-ACNC: <8 U/ML
CHLORIDE SERPL-SCNC: 101 MMOL/L
CO2 SERPL-SCNC: 24 MMOL/L
CREAT SERPL-MCNC: 0.61 MG/DL
CRP SERPL-MCNC: <3 MG/L
EGFR: 126 ML/MIN/1.73M2
ENA SS-A AB SER IA-ACNC: <0.2 AL
ENA SS-B AB SER IA-ACNC: <0.2 AL
EOSINOPHIL # BLD AUTO: 0.08 K/UL
EOSINOPHIL NFR BLD AUTO: 1.5 %
ERYTHROCYTE [SEDIMENTATION RATE] IN BLOOD BY WESTERGREN METHOD: 3 MM/HR
GLUCOSE SERPL-MCNC: 78 MG/DL
HCT VFR BLD CALC: 41.2 %
HGB BLD-MCNC: 12.5 G/DL
IMM GRANULOCYTES NFR BLD AUTO: 0.2 %
LYMPHOCYTES # BLD AUTO: 1.51 K/UL
LYMPHOCYTES NFR BLD AUTO: 28.7 %
MAN DIFF?: NORMAL
MCHC RBC-ENTMCNC: 29.8 PG
MCHC RBC-ENTMCNC: 30.3 GM/DL
MCV RBC AUTO: 98.1 FL
MONOCYTES # BLD AUTO: 0.47 K/UL
MONOCYTES NFR BLD AUTO: 8.9 %
NEUTROPHILS # BLD AUTO: 3.18 K/UL
NEUTROPHILS NFR BLD AUTO: 60.3 %
PLATELET # BLD AUTO: 282 K/UL
POTASSIUM SERPL-SCNC: 4.6 MMOL/L
PROT SERPL-MCNC: 7.1 G/DL
RBC # BLD: 4.2 M/UL
RBC # FLD: 13.5 %
RF+CCP IGG SER-IMP: NEGATIVE
RHEUMATOID FACT SER QL: 13 IU/ML
SODIUM SERPL-SCNC: 139 MMOL/L
T PALLIDUM AB SER QL IA: NEGATIVE
THYROGLOB AB SERPL-ACNC: 16.6 IU/ML
THYROPEROXIDASE AB SERPL IA-ACNC: <9 IU/ML
WBC # FLD AUTO: 5.27 K/UL

## 2024-10-26 PROBLEM — Z78.9 DOES NOT USE TOBACCO: Status: ACTIVE | Noted: 2024-10-26

## 2024-10-26 PROBLEM — Z82.61 FAMILY HISTORY OF RHEUMATOID ARTHRITIS: Status: ACTIVE | Noted: 2024-10-26

## 2024-10-28 LAB — ANACR T: NEGATIVE

## 2024-10-29 ENCOUNTER — NON-APPOINTMENT (OUTPATIENT)
Age: 29
End: 2024-10-29

## 2024-11-06 ENCOUNTER — OUTPATIENT (OUTPATIENT)
Dept: OUTPATIENT SERVICES | Facility: HOSPITAL | Age: 29
LOS: 1 days | Discharge: HOME | End: 2024-11-06
Payer: COMMERCIAL

## 2024-11-06 ENCOUNTER — APPOINTMENT (OUTPATIENT)
Dept: NEUROSURGERY | Facility: HOSPITAL | Age: 29
End: 2024-11-06

## 2024-11-06 DIAGNOSIS — Z86.79 PERSONAL HISTORY OF OTHER DISEASES OF THE CIRCULATORY SYSTEM: ICD-10-CM

## 2024-11-06 LAB
HCG SERPL-ACNC: <1 MIU/ML — SIGNIFICANT CHANGE UP
HCG UR QL: NEGATIVE — SIGNIFICANT CHANGE UP

## 2024-11-06 PROCEDURE — 84702 CHORIONIC GONADOTROPIN TEST: CPT

## 2024-11-06 PROCEDURE — C1894: CPT

## 2024-11-06 PROCEDURE — 36226 PLACE CATH VERTEBRAL ART: CPT | Mod: LT

## 2024-11-06 PROCEDURE — 36415 COLL VENOUS BLD VENIPUNCTURE: CPT

## 2024-11-06 PROCEDURE — 36224 PLACE CATH CAROTD ART: CPT | Mod: 50

## 2024-11-06 PROCEDURE — 81025 URINE PREGNANCY TEST: CPT

## 2024-11-06 PROCEDURE — C1887: CPT

## 2024-11-06 PROCEDURE — C1760: CPT

## 2024-11-06 PROCEDURE — 76937 US GUIDE VASCULAR ACCESS: CPT | Mod: 26

## 2024-11-06 PROCEDURE — C1769: CPT

## 2024-11-06 NOTE — BRIEF OPERATIVE NOTE - OPERATION/FINDINGS
Patient was brought to the angio suite, placed on x-ray table, placed on standard monitor by anesthesiologist. B/l groins were prepped and draped in usual sterile fashion.   5 [place] short sheath was used in the right common femoral artery for access. A diagnostic angiogram was performed under monitored anesthesia care. B/l ICAs, b/l ECAs and left vertebral artery were injected and studied.     Findings: Resolution of previously seen right para ophthalmic and right cavernous ICA aneurysms.  Patent flow diverter stent without evidence of intimal hyperplasia     Full report to follow  Patient tolerated the procedure well and at baseline neurological condition.   Right groin vascular access site was closed with vascade and applied manual compression.   There is no hematoma.   Patient was transferred to cath lab recovery and will return home later today.   Above discussed with Dr. Solomon.

## 2024-11-06 NOTE — PROGRESS NOTE ADULT - PROBLEM SELECTOR PLAN 1
Consent for cerebral angiogram obtained and in chart, all risks, benefits and alternatives discussed including risk of bleeding at access site, infection, spinal headache, stroke, vessel dissection. Plan for patient to be discharged to home after a brief period of observation in cath lab holding. We will determine the need to continue DAPT post angiogram. All questions answered; above discussed with Dr. Solomon.

## 2024-11-06 NOTE — PRE-ANESTHESIA EVALUATION ADULT - NSANTHDISPORD_GEN_ALL_CORE
Bladimir returned call and gave him information noted in previous message.  Verbalized understanding.   PACU
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
## 2024-11-06 NOTE — PROGRESS NOTE ADULT - ASSESSMENT
Patient is a 27yo female with PMHx headaches, sinus tachycardia, saccular ICA aneurysmsp[facility] ang[facility] s/p flow diverter stent placement (5/17/24) to the right paraophthalmic and cavernous ICA aneurysms here for cerebral angiogram.

## 2024-11-07 PROCEDURE — 36224 PLACE CATH CAROTD ART: CPT | Mod: 50

## 2024-11-07 PROCEDURE — 76937 US GUIDE VASCULAR ACCESS: CPT | Mod: 26

## 2024-11-07 PROCEDURE — 36226 PLACE CATH VERTEBRAL ART: CPT | Mod: LT

## 2024-11-08 PROCEDURE — 36224 PLACE CATH CAROTD ART: CPT | Mod: 50

## 2024-11-08 PROCEDURE — 76937 US GUIDE VASCULAR ACCESS: CPT | Mod: 26

## 2024-11-08 PROCEDURE — 36226 PLACE CATH VERTEBRAL ART: CPT | Mod: LT

## 2024-11-12 ENCOUNTER — APPOINTMENT (OUTPATIENT)
Dept: NEUROSURGERY | Facility: CLINIC | Age: 29
End: 2024-11-12
Payer: COMMERCIAL

## 2024-11-12 DIAGNOSIS — Z95.9 PRESENCE OF CARDIAC AND VASCULAR IMPLANT AND GRAFT, UNSPECIFIED: ICD-10-CM

## 2024-11-12 DIAGNOSIS — I67.1 CEREBRAL ANEURYSM, NONRUPTURED: ICD-10-CM

## 2024-11-12 DIAGNOSIS — S09.90XS POST-TRAUMATIC HEADACHE, UNSPECIFIED, NOT INTRACTABLE: ICD-10-CM

## 2024-11-12 DIAGNOSIS — G44.309 POST-TRAUMATIC HEADACHE, UNSPECIFIED, NOT INTRACTABLE: ICD-10-CM

## 2024-11-12 PROCEDURE — 99214 OFFICE O/P EST MOD 30 MIN: CPT

## 2024-11-13 DIAGNOSIS — F41.9 ANXIETY DISORDER, UNSPECIFIED: ICD-10-CM

## 2024-11-13 DIAGNOSIS — I67.1 CEREBRAL ANEURYSM, NONRUPTURED: ICD-10-CM

## 2024-11-13 DIAGNOSIS — Z79.02 LONG TERM (CURRENT) USE OF ANTITHROMBOTICS/ANTIPLATELETS: ICD-10-CM

## 2024-11-13 DIAGNOSIS — J45.909 UNSPECIFIED ASTHMA, UNCOMPLICATED: ICD-10-CM

## 2024-11-13 DIAGNOSIS — I47.10 SUPRAVENTRICULAR TACHYCARDIA, UNSPECIFIED: ICD-10-CM

## 2024-11-13 DIAGNOSIS — Z79.82 LONG TERM (CURRENT) USE OF ASPIRIN: ICD-10-CM

## 2024-11-25 ENCOUNTER — APPOINTMENT (OUTPATIENT)
Dept: OPHTHALMOLOGY | Facility: CLINIC | Age: 29
End: 2024-11-25
Payer: COMMERCIAL

## 2024-11-25 ENCOUNTER — NON-APPOINTMENT (OUTPATIENT)
Age: 29
End: 2024-11-25

## 2024-11-25 PROCEDURE — 92133 CPTRZD OPH DX IMG PST SGM ON: CPT

## 2024-11-25 PROCEDURE — 92014 COMPRE OPH EXAM EST PT 1/>: CPT

## 2025-02-06 ENCOUNTER — APPOINTMENT (OUTPATIENT)
Age: 30
End: 2025-02-06

## 2025-02-06 ENCOUNTER — APPOINTMENT (OUTPATIENT)
Dept: CARDIOLOGY | Facility: CLINIC | Age: 30
End: 2025-02-06

## 2025-02-06 VITALS
HEART RATE: 89 BPM | TEMPERATURE: 98.4 F | BODY MASS INDEX: 21.43 KG/M2 | HEIGHT: 64 IN | WEIGHT: 125.56 LBS | SYSTOLIC BLOOD PRESSURE: 115 MMHG | DIASTOLIC BLOOD PRESSURE: 72 MMHG | OXYGEN SATURATION: 100 %

## 2025-02-06 PROCEDURE — 99205 OFFICE O/P NEW HI 60 MIN: CPT

## 2025-05-14 ENCOUNTER — APPOINTMENT (OUTPATIENT)
Dept: CARDIOLOGY | Facility: CLINIC | Age: 30
End: 2025-05-14

## 2025-05-14 PROCEDURE — ZZZZZ: CPT

## 2025-06-18 ENCOUNTER — APPOINTMENT (OUTPATIENT)
Dept: CARDIOLOGY | Facility: CLINIC | Age: 30
End: 2025-06-18
Payer: COMMERCIAL

## 2025-06-18 PROCEDURE — 99215 OFFICE O/P EST HI 40 MIN: CPT | Mod: 95
